# Patient Record
Sex: FEMALE | Race: WHITE | NOT HISPANIC OR LATINO | Employment: UNEMPLOYED | URBAN - METROPOLITAN AREA
[De-identification: names, ages, dates, MRNs, and addresses within clinical notes are randomized per-mention and may not be internally consistent; named-entity substitution may affect disease eponyms.]

---

## 2023-01-01 ENCOUNTER — APPOINTMENT (OUTPATIENT)
Dept: GENERAL RADIOLOGY | Facility: HOSPITAL | Age: 78
DRG: 286 | End: 2023-01-01
Payer: MEDICARE

## 2023-01-01 ENCOUNTER — HOSPITAL ENCOUNTER (INPATIENT)
Facility: HOSPITAL | Age: 78
LOS: 4 days | DRG: 286 | End: 2023-03-20
Attending: EMERGENCY MEDICINE | Admitting: INTERNAL MEDICINE
Payer: MEDICARE

## 2023-01-01 ENCOUNTER — APPOINTMENT (OUTPATIENT)
Dept: CARDIOLOGY | Facility: HOSPITAL | Age: 78
DRG: 286 | End: 2023-01-01
Payer: MEDICARE

## 2023-01-01 VITALS
DIASTOLIC BLOOD PRESSURE: 59 MMHG | HEART RATE: 95 BPM | HEIGHT: 64 IN | RESPIRATION RATE: 19 BRPM | TEMPERATURE: 96.9 F | OXYGEN SATURATION: 90 % | WEIGHT: 128.75 LBS | BODY MASS INDEX: 21.98 KG/M2 | SYSTOLIC BLOOD PRESSURE: 113 MMHG

## 2023-01-01 DIAGNOSIS — I21.02 ST ELEVATION MYOCARDIAL INFARCTION INVOLVING LEFT ANTERIOR DESCENDING (LAD) CORONARY ARTERY: Primary | ICD-10-CM

## 2023-01-01 DIAGNOSIS — I48.91 ATRIAL FIBRILLATION WITH RVR: ICD-10-CM

## 2023-01-01 DIAGNOSIS — J44.1 COPD EXACERBATION: ICD-10-CM

## 2023-01-01 LAB
ACT BLD: 137 SECONDS (ref 89–137)
ALBUMIN SERPL-MCNC: 3 G/DL (ref 3.5–5.2)
ALBUMIN SERPL-MCNC: 3.6 G/DL (ref 3.5–5.2)
ALBUMIN SERPL-MCNC: 3.9 G/DL (ref 3.5–5.2)
ALBUMIN/GLOB SERPL: 1.4 G/DL
ALBUMIN/GLOB SERPL: 1.4 G/DL
ALP SERPL-CCNC: 71 U/L (ref 39–117)
ALP SERPL-CCNC: 82 U/L (ref 39–117)
ALT SERPL W P-5'-P-CCNC: 10 U/L (ref 1–33)
ALT SERPL W P-5'-P-CCNC: 18 U/L (ref 1–33)
AMMONIA BLD-SCNC: 37 UMOL/L (ref 11–51)
ANION GAP SERPL CALCULATED.3IONS-SCNC: 12 MMOL/L (ref 5–15)
ANION GAP SERPL CALCULATED.3IONS-SCNC: 12 MMOL/L (ref 5–15)
ANION GAP SERPL CALCULATED.3IONS-SCNC: 14 MMOL/L (ref 5–15)
ANION GAP SERPL CALCULATED.3IONS-SCNC: 15 MMOL/L (ref 5–15)
ARTERIAL PATENCY WRIST A: ABNORMAL
ARTERIAL PATENCY WRIST A: ABNORMAL
ARTERIAL PATENCY WRIST A: POSITIVE
AST SERPL-CCNC: 17 U/L (ref 1–32)
AST SERPL-CCNC: 34 U/L (ref 1–32)
ATMOSPHERIC PRESS: ABNORMAL MM[HG]
B PARAPERT DNA SPEC QL NAA+PROBE: NOT DETECTED
B PERT DNA SPEC QL NAA+PROBE: NOT DETECTED
BACTERIA SPEC RESP CULT: ABNORMAL
BACTERIA SPEC RESP CULT: ABNORMAL
BACTERIA UR QL AUTO: ABNORMAL /HPF
BASE EXCESS BLDA CALC-SCNC: -13.1 MMOL/L (ref 0–3)
BASE EXCESS BLDA CALC-SCNC: -2.8 MMOL/L (ref 0–3)
BASE EXCESS BLDA CALC-SCNC: -2.9 MMOL/L (ref 0–3)
BASE EXCESS BLDA CALC-SCNC: -4.8 MMOL/L (ref 0–3)
BASE EXCESS BLDA CALC-SCNC: -8.1 MMOL/L (ref 0–3)
BASE EXCESS BLDV CALC-SCNC: -3.8 MMOL/L (ref -2–2)
BASE EXCESS BLDV CALC-SCNC: -3.8 MMOL/L (ref -2–2)
BASOPHILS # BLD AUTO: 0.1 10*3/MM3 (ref 0–0.2)
BASOPHILS # BLD AUTO: 0.1 10*3/MM3 (ref 0–0.2)
BASOPHILS NFR BLD AUTO: 0.3 % (ref 0–1.5)
BASOPHILS NFR BLD AUTO: 0.6 % (ref 0–1.5)
BDY SITE: ABNORMAL
BH CV ECHO MEAS - EDV(CUBED): 91.1 ML
BH CV ECHO MEAS - EDV(MOD-SP4): 96.3 ML
BH CV ECHO MEAS - EF(MOD-SP4): 30.6 %
BH CV ECHO MEAS - ESV(CUBED): 35.9 ML
BH CV ECHO MEAS - ESV(MOD-SP4): 66.9 ML
BH CV ECHO MEAS - FS: 26.7 %
BH CV ECHO MEAS - IVS/LVPW: 1 CM
BH CV ECHO MEAS - IVSD: 0.8 CM
BH CV ECHO MEAS - LA DIMENSION: 3.1 CM
BH CV ECHO MEAS - LV DIASTOLIC VOL/BSA (35-75): 59.7 CM2
BH CV ECHO MEAS - LV MASS(C)D: 113.6 GRAMS
BH CV ECHO MEAS - LV SYSTOLIC VOL/BSA (12-30): 41.5 CM2
BH CV ECHO MEAS - LVIDD: 4.5 CM
BH CV ECHO MEAS - LVIDS: 3.3 CM
BH CV ECHO MEAS - LVOT AREA: 3.1 CM2
BH CV ECHO MEAS - LVOT DIAM: 2 CM
BH CV ECHO MEAS - LVPWD: 0.8 CM
BH CV ECHO MEAS - MV A MAX VEL: 53.5 CM/SEC
BH CV ECHO MEAS - MV DEC SLOPE: 248 CM/SEC2
BH CV ECHO MEAS - MV DEC TIME: 0.25 MSEC
BH CV ECHO MEAS - MV E MAX VEL: 36.2 CM/SEC
BH CV ECHO MEAS - MV E/A: 0.68
BH CV ECHO MEAS - MV MAX PG: 0.93 MMHG
BH CV ECHO MEAS - MV MEAN PG: 1 MMHG
BH CV ECHO MEAS - MV P1/2T: 61.5 MSEC
BH CV ECHO MEAS - MV V2 VTI: 11.6 CM
BH CV ECHO MEAS - MVA(P1/2T): 3.6 CM2
BH CV ECHO MEAS - PA V2 MAX: 73 CM/SEC
BH CV ECHO MEAS - RAP SYSTOLE: 3 MMHG
BH CV ECHO MEAS - RV MAX PG: 0.95 MMHG
BH CV ECHO MEAS - RV V1 MAX: 48.7 CM/SEC
BH CV ECHO MEAS - RV V1 VTI: 6.6 CM
BH CV ECHO MEAS - RVDD: 2.8 CM
BH CV ECHO MEAS - RVSP: 43.7 MMHG
BH CV ECHO MEAS - SI(MOD-SP4): 18.2 ML/M2
BH CV ECHO MEAS - SV(MOD-SP4): 29.4 ML
BH CV ECHO MEAS - TR MAX PG: 40.7 MMHG
BH CV ECHO MEAS - TR MAX VEL: 319 CM/SEC
BILIRUB SERPL-MCNC: 0.2 MG/DL (ref 0–1.2)
BILIRUB SERPL-MCNC: 0.2 MG/DL (ref 0–1.2)
BILIRUB UR QL STRIP: NEGATIVE
BUN SERPL-MCNC: 10 MG/DL (ref 8–23)
BUN SERPL-MCNC: 12 MG/DL (ref 8–23)
BUN SERPL-MCNC: 14 MG/DL (ref 8–23)
BUN SERPL-MCNC: 25 MG/DL (ref 8–23)
BUN/CREAT SERPL: 15.2 (ref 7–25)
BUN/CREAT SERPL: 16.1 (ref 7–25)
BUN/CREAT SERPL: 17.9 (ref 7–25)
BUN/CREAT SERPL: 26.3 (ref 7–25)
BURR CELLS BLD QL SMEAR: ABNORMAL
C PNEUM DNA NPH QL NAA+NON-PROBE: NOT DETECTED
CA-I BLDA-SCNC: 0.97 MMOL/L (ref 1.15–1.33)
CA-I BLDA-SCNC: 1.15 MMOL/L (ref 1.15–1.33)
CA-I SERPL ISE-MCNC: 1.05 MMOL/L (ref 1.2–1.3)
CALCIUM SPEC-SCNC: 7.5 MG/DL (ref 8.6–10.5)
CALCIUM SPEC-SCNC: 7.7 MG/DL (ref 8.6–10.5)
CALCIUM SPEC-SCNC: 8.2 MG/DL (ref 8.6–10.5)
CALCIUM SPEC-SCNC: 8.9 MG/DL (ref 8.6–10.5)
CHLORIDE SERPL-SCNC: 102 MMOL/L (ref 98–107)
CHLORIDE SERPL-SCNC: 104 MMOL/L (ref 98–107)
CHLORIDE SERPL-SCNC: 96 MMOL/L (ref 98–107)
CHLORIDE SERPL-SCNC: 97 MMOL/L (ref 98–107)
CHOLEST SERPL-MCNC: 193 MG/DL (ref 0–200)
CLARITY UR: ABNORMAL
CO2 BLDA-SCNC: 18.1 MMOL/L (ref 22–29)
CO2 BLDA-SCNC: 23.4 MMOL/L (ref 22–29)
CO2 BLDA-SCNC: 24.5 MMOL/L (ref 22–29)
CO2 BLDA-SCNC: 25.6 MMOL/L (ref 22–29)
CO2 BLDA-SCNC: 27.2 MMOL/L (ref 22–29)
CO2 BLDA-SCNC: 28.1 MMOL/L (ref 22–29)
CO2 SERPL-SCNC: 17 MMOL/L (ref 22–29)
CO2 SERPL-SCNC: 22 MMOL/L (ref 22–29)
CO2 SERPL-SCNC: 23 MMOL/L (ref 22–29)
CO2 SERPL-SCNC: 25 MMOL/L (ref 22–29)
COLOR UR: YELLOW
CREAT SERPL-MCNC: 0.62 MG/DL (ref 0.57–1)
CREAT SERPL-MCNC: 0.78 MG/DL (ref 0.57–1)
CREAT SERPL-MCNC: 0.79 MG/DL (ref 0.57–1)
CREAT SERPL-MCNC: 0.95 MG/DL (ref 0.57–1)
D-LACTATE SERPL-SCNC: 3.2 MMOL/L (ref 0.2–2)
D-LACTATE SERPL-SCNC: 3.4 MMOL/L (ref 0.2–2)
D-LACTATE SERPL-SCNC: 3.7 MMOL/L (ref 0.5–2)
DEPRECATED RDW RBC AUTO: 42.4 FL (ref 37–54)
DEPRECATED RDW RBC AUTO: 43.3 FL (ref 37–54)
DEPRECATED RDW RBC AUTO: 43.8 FL (ref 37–54)
EGFRCR SERPLBLD CKD-EPI 2021: 60.7 ML/MIN/1.73
EGFRCR SERPLBLD CKD-EPI 2021: 75.7 ML/MIN/1.73
EGFRCR SERPLBLD CKD-EPI 2021: 76.9 ML/MIN/1.73
EGFRCR SERPLBLD CKD-EPI 2021: 90.2 ML/MIN/1.73
EOSINOPHIL # BLD AUTO: 0 10*3/MM3 (ref 0–0.4)
EOSINOPHIL # BLD AUTO: 0.3 10*3/MM3 (ref 0–0.4)
EOSINOPHIL # BLD MANUAL: 0.77 10*3/MM3 (ref 0–0.4)
EOSINOPHIL NFR BLD AUTO: 0.1 % (ref 0.3–6.2)
EOSINOPHIL NFR BLD AUTO: 1.9 % (ref 0.3–6.2)
EOSINOPHIL NFR BLD MANUAL: 3 % (ref 0.3–6.2)
ERYTHROCYTE [DISTWIDTH] IN BLOOD BY AUTOMATED COUNT: 13.6 % (ref 12.3–15.4)
ERYTHROCYTE [DISTWIDTH] IN BLOOD BY AUTOMATED COUNT: 13.7 % (ref 12.3–15.4)
ERYTHROCYTE [DISTWIDTH] IN BLOOD BY AUTOMATED COUNT: 14.4 % (ref 12.3–15.4)
FLUAV SUBTYP SPEC NAA+PROBE: NOT DETECTED
FLUBV RNA ISLT QL NAA+PROBE: NOT DETECTED
GEN 5 2HR TROPONIN T REFLEX: 632 NG/L
GLOBULIN UR ELPH-MCNC: 2.1 GM/DL
GLOBULIN UR ELPH-MCNC: 2.7 GM/DL
GLUCOSE BLDC GLUCOMTR-MCNC: 100 MG/DL (ref 70–105)
GLUCOSE BLDC GLUCOMTR-MCNC: 105 MG/DL (ref 70–105)
GLUCOSE BLDC GLUCOMTR-MCNC: 109 MG/DL (ref 70–105)
GLUCOSE BLDC GLUCOMTR-MCNC: 110 MG/DL (ref 70–105)
GLUCOSE BLDC GLUCOMTR-MCNC: 112 MG/DL (ref 70–105)
GLUCOSE BLDC GLUCOMTR-MCNC: 112 MG/DL (ref 70–105)
GLUCOSE BLDC GLUCOMTR-MCNC: 121 MG/DL (ref 70–105)
GLUCOSE BLDC GLUCOMTR-MCNC: 122 MG/DL (ref 70–105)
GLUCOSE BLDC GLUCOMTR-MCNC: 127 MG/DL (ref 70–105)
GLUCOSE BLDC GLUCOMTR-MCNC: 128 MG/DL (ref 70–105)
GLUCOSE BLDC GLUCOMTR-MCNC: 140 MG/DL (ref 70–105)
GLUCOSE BLDC GLUCOMTR-MCNC: 145 MG/DL (ref 70–105)
GLUCOSE BLDC GLUCOMTR-MCNC: 156 MG/DL (ref 70–105)
GLUCOSE BLDC GLUCOMTR-MCNC: 166 MG/DL (ref 70–105)
GLUCOSE BLDC GLUCOMTR-MCNC: 169 MG/DL (ref 70–105)
GLUCOSE BLDC GLUCOMTR-MCNC: 178 MG/DL (ref 70–105)
GLUCOSE BLDC GLUCOMTR-MCNC: 181 MG/DL (ref 70–105)
GLUCOSE BLDC GLUCOMTR-MCNC: 191 MG/DL (ref 70–105)
GLUCOSE BLDC GLUCOMTR-MCNC: 256 MG/DL (ref 70–105)
GLUCOSE BLDC GLUCOMTR-MCNC: 321 MG/DL (ref 70–105)
GLUCOSE BLDC GLUCOMTR-MCNC: 417 MG/DL (ref 70–105)
GLUCOSE BLDC GLUCOMTR-MCNC: 448 MG/DL (ref 74–100)
GLUCOSE BLDC GLUCOMTR-MCNC: 448 MG/DL (ref 74–100)
GLUCOSE BLDC GLUCOMTR-MCNC: 476 MG/DL (ref 74–100)
GLUCOSE BLDC GLUCOMTR-MCNC: 476 MG/DL (ref 74–100)
GLUCOSE SERPL-MCNC: 134 MG/DL (ref 65–99)
GLUCOSE SERPL-MCNC: 159 MG/DL (ref 65–99)
GLUCOSE SERPL-MCNC: 254 MG/DL (ref 65–99)
GLUCOSE SERPL-MCNC: 435 MG/DL (ref 65–99)
GLUCOSE UR STRIP-MCNC: ABNORMAL MG/DL
GRAM STN SPEC: ABNORMAL
HADV DNA SPEC NAA+PROBE: NOT DETECTED
HBA1C MFR BLD: 5.7 % (ref 4.8–5.6)
HCO3 BLDA-SCNC: 17.1 MMOL/L (ref 21–28)
HCO3 BLDA-SCNC: 22.2 MMOL/L (ref 21–28)
HCO3 BLDA-SCNC: 23.8 MMOL/L (ref 21–28)
HCO3 BLDA-SCNC: 23.8 MMOL/L (ref 21–28)
HCO3 BLDA-SCNC: 25.3 MMOL/L (ref 21–28)
HCO3 BLDV-SCNC: 23 MMOL/L (ref 22–26)
HCO3 BLDV-SCNC: 25.2 MMOL/L (ref 22–26)
HCOV 229E RNA SPEC QL NAA+PROBE: NOT DETECTED
HCOV HKU1 RNA SPEC QL NAA+PROBE: NOT DETECTED
HCOV NL63 RNA SPEC QL NAA+PROBE: NOT DETECTED
HCOV OC43 RNA SPEC QL NAA+PROBE: NOT DETECTED
HCT VFR BLD AUTO: 36.6 % (ref 34–46.6)
HCT VFR BLD AUTO: 36.9 % (ref 34–46.6)
HCT VFR BLD AUTO: 39.9 % (ref 34–46.6)
HCT VFR BLDA CALC: 34 % (ref 38–51)
HCT VFR BLDA CALC: 40 % (ref 38–51)
HDLC SERPL-MCNC: 78 MG/DL (ref 40–60)
HEMODILUTION: NO
HGB BLD-MCNC: 11.3 G/DL (ref 12–15.9)
HGB BLD-MCNC: 11.7 G/DL (ref 12–15.9)
HGB BLD-MCNC: 12.9 G/DL (ref 12–15.9)
HGB BLDA-MCNC: 11.5 G/DL (ref 12–17)
HGB BLDA-MCNC: 13.5 G/DL (ref 12–17)
HGB UR QL STRIP.AUTO: ABNORMAL
HMPV RNA NPH QL NAA+NON-PROBE: NOT DETECTED
HPIV1 RNA ISLT QL NAA+PROBE: NOT DETECTED
HPIV2 RNA SPEC QL NAA+PROBE: NOT DETECTED
HPIV3 RNA NPH QL NAA+PROBE: NOT DETECTED
HPIV4 P GENE NPH QL NAA+PROBE: NOT DETECTED
HYALINE CASTS UR QL AUTO: ABNORMAL /LPF
INHALED O2 CONCENTRATION: 100 %
INHALED O2 CONCENTRATION: 40 %
INHALED O2 CONCENTRATION: 44 %
INHALED O2 CONCENTRATION: 44 %
INHALED O2 CONCENTRATION: 46 %
INR PPP: 0.96 (ref 0.93–1.1)
KETONES UR QL STRIP: NEGATIVE
L PNEUMO1 AG UR QL IA: NEGATIVE
LARGE PLATELETS: ABNORMAL
LDLC SERPL CALC-MCNC: 100 MG/DL (ref 0–100)
LDLC/HDLC SERPL: 1.26 {RATIO}
LEFT ATRIUM VOLUME INDEX: 24.3 ML/M2
LEUKOCYTE ESTERASE UR QL STRIP.AUTO: NEGATIVE
LYMPHOCYTES # BLD AUTO: 1.1 10*3/MM3 (ref 0.7–3.1)
LYMPHOCYTES # BLD AUTO: 5.4 10*3/MM3 (ref 0.7–3.1)
LYMPHOCYTES # BLD MANUAL: 2.84 10*3/MM3 (ref 0.7–3.1)
LYMPHOCYTES NFR BLD AUTO: 34 % (ref 19.6–45.3)
LYMPHOCYTES NFR BLD AUTO: 4.4 % (ref 19.6–45.3)
M PNEUMO IGG SER IA-ACNC: NOT DETECTED
MAGNESIUM SERPL-MCNC: 1.9 MG/DL (ref 1.6–2.4)
MAGNESIUM SERPL-MCNC: 2 MG/DL (ref 1.6–2.4)
MAGNESIUM SERPL-MCNC: 2 MG/DL (ref 1.6–2.4)
MAXIMAL PREDICTED HEART RATE: 140 BPM
MCH RBC QN AUTO: 26.9 PG (ref 26.6–33)
MCH RBC QN AUTO: 27.1 PG (ref 26.6–33)
MCH RBC QN AUTO: 27.6 PG (ref 26.6–33)
MCHC RBC AUTO-ENTMCNC: 30.9 G/DL (ref 31.5–35.7)
MCHC RBC AUTO-ENTMCNC: 31.7 G/DL (ref 31.5–35.7)
MCHC RBC AUTO-ENTMCNC: 32.3 G/DL (ref 31.5–35.7)
MCV RBC AUTO: 85.3 FL (ref 79–97)
MCV RBC AUTO: 85.5 FL (ref 79–97)
MCV RBC AUTO: 86.9 FL (ref 79–97)
MODALITY: ABNORMAL
MONOCYTES # BLD AUTO: 1.1 10*3/MM3 (ref 0.1–0.9)
MONOCYTES # BLD AUTO: 1.1 10*3/MM3 (ref 0.1–0.9)
MONOCYTES NFR BLD AUTO: 4.3 % (ref 5–12)
MONOCYTES NFR BLD AUTO: 7 % (ref 5–12)
MRSA DNA SPEC QL NAA+PROBE: NORMAL
MYELOCYTES NFR BLD MANUAL: 1 % (ref 0–0)
NEUTROPHILS # BLD AUTO: 21.93 10*3/MM3 (ref 1.7–7)
NEUTROPHILS NFR BLD AUTO: 23.6 10*3/MM3 (ref 1.7–7)
NEUTROPHILS NFR BLD AUTO: 56.5 % (ref 42.7–76)
NEUTROPHILS NFR BLD AUTO: 8.9 10*3/MM3 (ref 1.7–7)
NEUTROPHILS NFR BLD AUTO: 90.9 % (ref 42.7–76)
NEUTROPHILS NFR BLD MANUAL: 76 % (ref 42.7–76)
NEUTS BAND NFR BLD MANUAL: 9 % (ref 0–5)
NITRITE UR QL STRIP: NEGATIVE
NRBC BLD AUTO-RTO: 0.1 /100 WBC (ref 0–0.2)
NRBC BLD AUTO-RTO: 0.2 /100 WBC (ref 0–0.2)
PCO2 BLDA: 140.2 MM HG (ref 35–48)
PCO2 BLDA: 33.6 MM HG (ref 35–48)
PCO2 BLDA: 38.9 MM HG (ref 35–48)
PCO2 BLDA: 59.1 MM HG (ref 35–48)
PCO2 BLDA: 59.7 MM HG (ref 35–48)
PCO2 BLDV: 48 MM HG (ref 42–51)
PCO2 BLDV: 64.3 MM HG (ref 42–51)
PEEP RESPIRATORY: 5 CM[H2O]
PH BLDA: 6.84 PH UNITS (ref 7.35–7.45)
PH BLDA: 7.21 PH UNITS (ref 7.35–7.45)
PH BLDA: 7.24 PH UNITS (ref 7.35–7.45)
PH BLDA: 7.32 PH UNITS (ref 7.35–7.45)
PH BLDA: 7.37 PH UNITS (ref 7.35–7.45)
PH BLDV: 7.2 PH UNITS (ref 7.32–7.43)
PH BLDV: 7.29 PH UNITS (ref 7.32–7.43)
PH UR STRIP.AUTO: <=5 [PH] (ref 5–8)
PHOSPHATE SERPL-MCNC: 4.6 MG/DL (ref 2.5–4.5)
PHOSPHATE SERPL-MCNC: 8 MG/DL (ref 2.5–4.5)
PLATELET # BLD AUTO: 181 10*3/MM3 (ref 140–450)
PLATELET # BLD AUTO: 310 10*3/MM3 (ref 140–450)
PLATELET # BLD AUTO: 317 10*3/MM3 (ref 140–450)
PMV BLD AUTO: 7.7 FL (ref 6–12)
PMV BLD AUTO: 7.7 FL (ref 6–12)
PMV BLD AUTO: 7.8 FL (ref 6–12)
PO2 BLDA: 113.1 MM HG (ref 83–108)
PO2 BLDA: 114.2 MM HG (ref 83–108)
PO2 BLDA: 141 MM HG (ref 83–108)
PO2 BLDA: 175.1 MM HG (ref 83–108)
PO2 BLDA: 94.2 MM HG (ref 83–108)
PO2 BLDV: 32 MM HG (ref 40–42)
PO2 BLDV: 40.3 MM HG (ref 40–42)
POIKILOCYTOSIS BLD QL SMEAR: ABNORMAL
POTASSIUM BLDA-SCNC: 4 MMOL/L (ref 3.5–4.5)
POTASSIUM BLDA-SCNC: 4.4 MMOL/L (ref 3.5–4.5)
POTASSIUM SERPL-SCNC: 4.2 MMOL/L (ref 3.5–5.2)
POTASSIUM SERPL-SCNC: 4.5 MMOL/L (ref 3.5–5.2)
POTASSIUM SERPL-SCNC: 4.8 MMOL/L (ref 3.5–5.2)
POTASSIUM SERPL-SCNC: 4.9 MMOL/L (ref 3.5–5.2)
PROCALCITONIN SERPL-MCNC: 0.06 NG/ML (ref 0–0.25)
PROT SERPL-MCNC: 5.1 G/DL (ref 6–8.5)
PROT SERPL-MCNC: 6.6 G/DL (ref 6–8.5)
PROT UR QL STRIP: ABNORMAL
PROTHROMBIN TIME: 9.9 SECONDS (ref 9.6–11.7)
PSV: 10 CMH2O
QT INTERVAL: 299 MS
QT INTERVAL: 315 MS
QT INTERVAL: 358 MS
QT INTERVAL: 397 MS
RBC # BLD AUTO: 4.22 10*6/MM3 (ref 3.77–5.28)
RBC # BLD AUTO: 4.32 10*6/MM3 (ref 3.77–5.28)
RBC # BLD AUTO: 4.68 10*6/MM3 (ref 3.77–5.28)
RBC # UR STRIP: ABNORMAL /HPF
REF LAB TEST METHOD: ABNORMAL
RESPIRATORY RATE: 24
RESPIRATORY RATE: 24
RHINOVIRUS RNA SPEC NAA+PROBE: NOT DETECTED
RSV RNA NPH QL NAA+NON-PROBE: NOT DETECTED
S PNEUM AG SPEC QL LA: NEGATIVE
SAO2 % BLDCOA: 95.1 % (ref 94–98)
SAO2 % BLDCOA: 97.3 % (ref 94–98)
SAO2 % BLDCOA: 97.4 % (ref 94–98)
SAO2 % BLDCOA: 98.3 % (ref 94–98)
SAO2 % BLDCOA: 99 % (ref 94–98)
SAO2 % BLDCOV: 47 % (ref 45–75)
SAO2 % BLDCOV: 68.8 % (ref 45–75)
SARS-COV-2 RNA NPH QL NAA+NON-PROBE: NOT DETECTED
SINUS: 4 CM
SODIUM BLD-SCNC: 123 MMOL/L (ref 138–146)
SODIUM BLD-SCNC: 132 MMOL/L (ref 138–146)
SODIUM SERPL-SCNC: 133 MMOL/L (ref 136–145)
SODIUM SERPL-SCNC: 134 MMOL/L (ref 136–145)
SODIUM SERPL-SCNC: 135 MMOL/L (ref 136–145)
SODIUM SERPL-SCNC: 137 MMOL/L (ref 136–145)
SP GR UR STRIP: 1.05 (ref 1–1.03)
SQUAMOUS #/AREA URNS HPF: ABNORMAL /HPF
STRESS TARGET HR: 119 BPM
TRIGL SERPL-MCNC: 83 MG/DL (ref 0–150)
TROPONIN T DELTA: 594 NG/L
TROPONIN T SERPL HS-MCNC: 38 NG/L
TSH SERPL DL<=0.05 MIU/L-ACNC: 2.79 UIU/ML (ref 0.27–4.2)
UROBILINOGEN UR QL STRIP: ABNORMAL
VARIANT LYMPHS NFR BLD MANUAL: 4 % (ref 0–5)
VARIANT LYMPHS NFR BLD MANUAL: 7 % (ref 19.6–45.3)
VENTILATOR MODE: ABNORMAL
VLDLC SERPL-MCNC: 15 MG/DL (ref 5–40)
VT ON VENT VENT: 500 ML
VT ON VENT VENT: 500 ML
WBC # UR STRIP: ABNORMAL /HPF
WBC MORPH BLD: NORMAL
WBC NRBC COR # BLD: 15.7 10*3/MM3 (ref 3.4–10.8)
WBC NRBC COR # BLD: 25.8 10*3/MM3 (ref 3.4–10.8)
WBC NRBC COR # BLD: 25.9 10*3/MM3 (ref 3.4–10.8)
WHOLE BLOOD HOLD COAG: NORMAL
WHOLE BLOOD HOLD SPECIMEN: NORMAL

## 2023-01-01 PROCEDURE — 25010000002 METHYLPREDNISOLONE PER 40 MG: Performed by: INTERNAL MEDICINE

## 2023-01-01 PROCEDURE — 84145 PROCALCITONIN (PCT): CPT | Performed by: INTERNAL MEDICINE

## 2023-01-01 PROCEDURE — 83605 ASSAY OF LACTIC ACID: CPT

## 2023-01-01 PROCEDURE — 25010000002 ONDANSETRON PER 1 MG: Performed by: EMERGENCY MEDICINE

## 2023-01-01 PROCEDURE — 85610 PROTHROMBIN TIME: CPT | Performed by: EMERGENCY MEDICINE

## 2023-01-01 PROCEDURE — 82330 ASSAY OF CALCIUM: CPT | Performed by: NURSE PRACTITIONER

## 2023-01-01 PROCEDURE — 99233 SBSQ HOSP IP/OBS HIGH 50: CPT | Performed by: INTERNAL MEDICINE

## 2023-01-01 PROCEDURE — C1894 INTRO/SHEATH, NON-LASER: HCPCS | Performed by: INTERNAL MEDICINE

## 2023-01-01 PROCEDURE — 80069 RENAL FUNCTION PANEL: CPT | Performed by: INTERNAL MEDICINE

## 2023-01-01 PROCEDURE — B2111ZZ FLUOROSCOPY OF MULTIPLE CORONARY ARTERIES USING LOW OSMOLAR CONTRAST: ICD-10-PCS | Performed by: INTERNAL MEDICINE

## 2023-01-01 PROCEDURE — 99285 EMERGENCY DEPT VISIT HI MDM: CPT

## 2023-01-01 PROCEDURE — 80048 BASIC METABOLIC PNL TOTAL CA: CPT | Performed by: INTERNAL MEDICINE

## 2023-01-01 PROCEDURE — 83735 ASSAY OF MAGNESIUM: CPT | Performed by: EMERGENCY MEDICINE

## 2023-01-01 PROCEDURE — 82962 GLUCOSE BLOOD TEST: CPT

## 2023-01-01 PROCEDURE — 93306 TTE W/DOPPLER COMPLETE: CPT | Performed by: INTERNAL MEDICINE

## 2023-01-01 PROCEDURE — 94761 N-INVAS EAR/PLS OXIMETRY MLT: CPT

## 2023-01-01 PROCEDURE — 80053 COMPREHEN METABOLIC PANEL: CPT | Performed by: EMERGENCY MEDICINE

## 2023-01-01 PROCEDURE — B2151ZZ FLUOROSCOPY OF LEFT HEART USING LOW OSMOLAR CONTRAST: ICD-10-PCS | Performed by: INTERNAL MEDICINE

## 2023-01-01 PROCEDURE — 94660 CPAP INITIATION&MGMT: CPT

## 2023-01-01 PROCEDURE — 25010000002 FUROSEMIDE PER 20 MG: Performed by: INTERNAL MEDICINE

## 2023-01-01 PROCEDURE — 94799 UNLISTED PULMONARY SVC/PX: CPT

## 2023-01-01 PROCEDURE — 25010000002 PHENYLEPHRINE 10 MG/ML SOLUTION: Performed by: INTERNAL MEDICINE

## 2023-01-01 PROCEDURE — 83735 ASSAY OF MAGNESIUM: CPT | Performed by: NURSE PRACTITIONER

## 2023-01-01 PROCEDURE — 99223 1ST HOSP IP/OBS HIGH 75: CPT | Performed by: INTERNAL MEDICINE

## 2023-01-01 PROCEDURE — 85025 COMPLETE CBC W/AUTO DIFF WBC: CPT | Performed by: EMERGENCY MEDICINE

## 2023-01-01 PROCEDURE — 25010000002 LORAZEPAM PER 2 MG: Performed by: STUDENT IN AN ORGANIZED HEALTH CARE EDUCATION/TRAINING PROGRAM

## 2023-01-01 PROCEDURE — 93010 ELECTROCARDIOGRAM REPORT: CPT | Performed by: INTERNAL MEDICINE

## 2023-01-01 PROCEDURE — 25010000002 ENOXAPARIN PER 10 MG: Performed by: INTERNAL MEDICINE

## 2023-01-01 PROCEDURE — 25010000002 AMIODARONE IN DEXTROSE 5% 150-4.21 MG/100ML-% SOLUTION: Performed by: STUDENT IN AN ORGANIZED HEALTH CARE EDUCATION/TRAINING PROGRAM

## 2023-01-01 PROCEDURE — 80053 COMPREHEN METABOLIC PANEL: CPT | Performed by: NURSE PRACTITIONER

## 2023-01-01 PROCEDURE — 71045 X-RAY EXAM CHEST 1 VIEW: CPT

## 2023-01-01 PROCEDURE — 85027 COMPLETE CBC AUTOMATED: CPT | Performed by: NURSE PRACTITIONER

## 2023-01-01 PROCEDURE — 81001 URINALYSIS AUTO W/SCOPE: CPT | Performed by: NURSE PRACTITIONER

## 2023-01-01 PROCEDURE — 93005 ELECTROCARDIOGRAM TRACING: CPT | Performed by: EMERGENCY MEDICINE

## 2023-01-01 PROCEDURE — 25010000002 MORPHINE PER 10 MG: Performed by: STUDENT IN AN ORGANIZED HEALTH CARE EDUCATION/TRAINING PROGRAM

## 2023-01-01 PROCEDURE — 25010000002 LORAZEPAM PER 2 MG

## 2023-01-01 PROCEDURE — 93454 CORONARY ARTERY ANGIO S&I: CPT | Performed by: INTERNAL MEDICINE

## 2023-01-01 PROCEDURE — 25010000002 AMIODARONE PER 30 MG: Performed by: STUDENT IN AN ORGANIZED HEALTH CARE EDUCATION/TRAINING PROGRAM

## 2023-01-01 PROCEDURE — C9803 HOPD COVID-19 SPEC COLLECT: HCPCS | Performed by: INTERNAL MEDICINE

## 2023-01-01 PROCEDURE — 4A023N7 MEASUREMENT OF CARDIAC SAMPLING AND PRESSURE, LEFT HEART, PERCUTANEOUS APPROACH: ICD-10-PCS | Performed by: INTERNAL MEDICINE

## 2023-01-01 PROCEDURE — 85018 HEMOGLOBIN: CPT

## 2023-01-01 PROCEDURE — 25010000002 HEPARIN (PORCINE) PER 1000 UNITS: Performed by: EMERGENCY MEDICINE

## 2023-01-01 PROCEDURE — 84443 ASSAY THYROID STIM HORMONE: CPT | Performed by: INTERNAL MEDICINE

## 2023-01-01 PROCEDURE — 25010000002 DIGOXIN PER 500 MCG: Performed by: INTERNAL MEDICINE

## 2023-01-01 PROCEDURE — 25010000002 MIDAZOLAM PER 1 MG: Performed by: INTERNAL MEDICINE

## 2023-01-01 PROCEDURE — 87449 NOS EACH ORGANISM AG IA: CPT | Performed by: NURSE PRACTITIONER

## 2023-01-01 PROCEDURE — 25010000002 PROPOFOL 10 MG/ML EMULSION: Performed by: INTERNAL MEDICINE

## 2023-01-01 PROCEDURE — 25010000002 ONDANSETRON PER 1 MG: Performed by: NURSE PRACTITIONER

## 2023-01-01 PROCEDURE — 82330 ASSAY OF CALCIUM: CPT

## 2023-01-01 PROCEDURE — 80061 LIPID PANEL: CPT | Performed by: NURSE PRACTITIONER

## 2023-01-01 PROCEDURE — 87205 SMEAR GRAM STAIN: CPT | Performed by: NURSE PRACTITIONER

## 2023-01-01 PROCEDURE — 82803 BLOOD GASES ANY COMBINATION: CPT

## 2023-01-01 PROCEDURE — 94003 VENT MGMT INPAT SUBQ DAY: CPT

## 2023-01-01 PROCEDURE — 94640 AIRWAY INHALATION TREATMENT: CPT

## 2023-01-01 PROCEDURE — 84484 ASSAY OF TROPONIN QUANT: CPT | Performed by: EMERGENCY MEDICINE

## 2023-01-01 PROCEDURE — 85007 BL SMEAR W/DIFF WBC COUNT: CPT | Performed by: NURSE PRACTITIONER

## 2023-01-01 PROCEDURE — 36600 WITHDRAWAL OF ARTERIAL BLOOD: CPT

## 2023-01-01 PROCEDURE — 87899 AGENT NOS ASSAY W/OPTIC: CPT | Performed by: NURSE PRACTITIONER

## 2023-01-01 PROCEDURE — 63710000001 INSULIN LISPRO (HUMAN) PER 5 UNITS: Performed by: NURSE PRACTITIONER

## 2023-01-01 PROCEDURE — 93005 ELECTROCARDIOGRAM TRACING: CPT

## 2023-01-01 PROCEDURE — 82140 ASSAY OF AMMONIA: CPT | Performed by: STUDENT IN AN ORGANIZED HEALTH CARE EDUCATION/TRAINING PROGRAM

## 2023-01-01 PROCEDURE — 25010000002 PHENYLEPHRINE 10 MG/ML SOLUTION 5 ML VIAL: Performed by: INTERNAL MEDICINE

## 2023-01-01 PROCEDURE — 85025 COMPLETE CBC W/AUTO DIFF WBC: CPT | Performed by: NURSE PRACTITIONER

## 2023-01-01 PROCEDURE — 87185 SC STD ENZYME DETCJ PER NZM: CPT | Performed by: NURSE PRACTITIONER

## 2023-01-01 PROCEDURE — 36415 COLL VENOUS BLD VENIPUNCTURE: CPT | Performed by: NURSE PRACTITIONER

## 2023-01-01 PROCEDURE — 85347 COAGULATION TIME ACTIVATED: CPT

## 2023-01-01 PROCEDURE — 87077 CULTURE AEROBIC IDENTIFY: CPT | Performed by: NURSE PRACTITIONER

## 2023-01-01 PROCEDURE — 25010000002 CEFEPIME PER 500 MG: Performed by: NURSE PRACTITIONER

## 2023-01-01 PROCEDURE — 87040 BLOOD CULTURE FOR BACTERIA: CPT | Performed by: NURSE PRACTITIONER

## 2023-01-01 PROCEDURE — 93306 TTE W/DOPPLER COMPLETE: CPT

## 2023-01-01 PROCEDURE — C1751 CATH, INF, PER/CENT/MIDLINE: HCPCS

## 2023-01-01 PROCEDURE — 93005 ELECTROCARDIOGRAM TRACING: CPT | Performed by: STUDENT IN AN ORGANIZED HEALTH CARE EDUCATION/TRAINING PROGRAM

## 2023-01-01 PROCEDURE — 25010000002 PHENYLEPHRINE 10 MG/ML SOLUTION: Performed by: NURSE PRACTITIONER

## 2023-01-01 PROCEDURE — 84100 ASSAY OF PHOSPHORUS: CPT | Performed by: NURSE PRACTITIONER

## 2023-01-01 PROCEDURE — 02HV33Z INSERTION OF INFUSION DEVICE INTO SUPERIOR VENA CAVA, PERCUTANEOUS APPROACH: ICD-10-PCS | Performed by: STUDENT IN AN ORGANIZED HEALTH CARE EDUCATION/TRAINING PROGRAM

## 2023-01-01 PROCEDURE — 25010000002 MORPHINE PER 10 MG

## 2023-01-01 PROCEDURE — 25010000002 HYDROMORPHONE 1 MG/ML SOLUTION: Performed by: STUDENT IN AN ORGANIZED HEALTH CARE EDUCATION/TRAINING PROGRAM

## 2023-01-01 PROCEDURE — 87641 MR-STAPH DNA AMP PROBE: CPT | Performed by: NURSE PRACTITIONER

## 2023-01-01 PROCEDURE — 87070 CULTURE OTHR SPECIMN AEROBIC: CPT | Performed by: NURSE PRACTITIONER

## 2023-01-01 PROCEDURE — 83036 HEMOGLOBIN GLYCOSYLATED A1C: CPT | Performed by: INTERNAL MEDICINE

## 2023-01-01 PROCEDURE — 25010000002 CALCIUM GLUCONATE 2-0.675 GM/100ML-% SOLUTION: Performed by: NURSE PRACTITIONER

## 2023-01-01 PROCEDURE — 94002 VENT MGMT INPAT INIT DAY: CPT

## 2023-01-01 PROCEDURE — 80051 ELECTROLYTE PANEL: CPT

## 2023-01-01 PROCEDURE — C1769 GUIDE WIRE: HCPCS | Performed by: INTERNAL MEDICINE

## 2023-01-01 PROCEDURE — 25510000001 IOPAMIDOL PER 1 ML: Performed by: INTERNAL MEDICINE

## 2023-01-01 PROCEDURE — 25010000002 MIDAZOLAM PER 1 MG

## 2023-01-01 PROCEDURE — 0202U NFCT DS 22 TRGT SARS-COV-2: CPT | Performed by: INTERNAL MEDICINE

## 2023-01-01 RX ORDER — ENOXAPARIN SODIUM 100 MG/ML
40 INJECTION SUBCUTANEOUS EVERY 24 HOURS
Status: DISCONTINUED | OUTPATIENT
Start: 2023-01-01 | End: 2023-01-01

## 2023-01-01 RX ORDER — POTASSIUM CHLORIDE 750 MG/1
10 CAPSULE, EXTENDED RELEASE ORAL DAILY
COMMUNITY

## 2023-01-01 RX ORDER — DEXTROSE MONOHYDRATE 25 G/50ML
25 INJECTION, SOLUTION INTRAVENOUS
Status: DISCONTINUED | OUTPATIENT
Start: 2023-01-01 | End: 2023-01-01

## 2023-01-01 RX ORDER — LORAZEPAM 2 MG/ML
1 CONCENTRATE ORAL
Status: DISCONTINUED | OUTPATIENT
Start: 2023-01-01 | End: 2023-01-01 | Stop reason: HOSPADM

## 2023-01-01 RX ORDER — LORAZEPAM 2 MG/ML
1 INJECTION INTRAMUSCULAR
Status: DISCONTINUED | OUTPATIENT
Start: 2023-01-01 | End: 2023-01-01

## 2023-01-01 RX ORDER — DILTIAZEM HCL/D5W 125 MG/125
5-15 PLASTIC BAG, INJECTION (ML) INTRAVENOUS CONTINUOUS
Status: DISCONTINUED | OUTPATIENT
Start: 2023-01-01 | End: 2023-01-01

## 2023-01-01 RX ORDER — IPRATROPIUM BROMIDE AND ALBUTEROL SULFATE 2.5; .5 MG/3ML; MG/3ML
3 SOLUTION RESPIRATORY (INHALATION)
Status: DISCONTINUED | OUTPATIENT
Start: 2023-01-01 | End: 2023-01-01

## 2023-01-01 RX ORDER — ASPIRIN 81 MG/1
81 TABLET ORAL DAILY
Status: DISCONTINUED | OUTPATIENT
Start: 2023-01-01 | End: 2023-01-01

## 2023-01-01 RX ORDER — SODIUM CHLORIDE 9 MG/ML
30 INJECTION, SOLUTION INTRAVENOUS CONTINUOUS
Status: DISCONTINUED | OUTPATIENT
Start: 2023-01-01 | End: 2023-01-01

## 2023-01-01 RX ORDER — ACETAMINOPHEN 325 MG/1
650 TABLET ORAL EVERY 4 HOURS PRN
Status: DISCONTINUED | OUTPATIENT
Start: 2023-01-01 | End: 2023-01-01 | Stop reason: SDUPTHER

## 2023-01-01 RX ORDER — MORPHINE SULFATE 2 MG/ML
2 INJECTION, SOLUTION INTRAMUSCULAR; INTRAVENOUS EVERY 4 HOURS PRN
Status: DISCONTINUED | OUTPATIENT
Start: 2023-01-01 | End: 2023-01-01

## 2023-01-01 RX ORDER — BUDESONIDE 0.5 MG/2ML
1 INHALANT ORAL
Status: DISCONTINUED | OUTPATIENT
Start: 2023-01-01 | End: 2023-01-01

## 2023-01-01 RX ORDER — PHENYLEPHRINE HYDROCHLORIDE 10 MG/ML
INJECTION INTRAVENOUS
Status: DISCONTINUED | OUTPATIENT
Start: 2023-01-01 | End: 2023-01-01 | Stop reason: HOSPADM

## 2023-01-01 RX ORDER — GLYCOPYRROLATE 0.2 MG/ML
0.2 INJECTION INTRAMUSCULAR; INTRAVENOUS
Status: DISCONTINUED | OUTPATIENT
Start: 2023-01-01 | End: 2023-01-01 | Stop reason: HOSPADM

## 2023-01-01 RX ORDER — SODIUM CHLORIDE 9 MG/ML
250 INJECTION, SOLUTION INTRAVENOUS ONCE AS NEEDED
Status: DISCONTINUED | OUTPATIENT
Start: 2023-01-01 | End: 2023-01-01

## 2023-01-01 RX ORDER — DILTIAZEM HYDROCHLORIDE 5 MG/ML
10 INJECTION INTRAVENOUS ONCE
Status: COMPLETED | OUTPATIENT
Start: 2023-01-01 | End: 2023-01-01

## 2023-01-01 RX ORDER — CARBOXYMETHYLCELLULOSE SODIUM 10 MG/ML
1 GEL OPHTHALMIC
Status: DISCONTINUED | OUTPATIENT
Start: 2023-01-01 | End: 2023-01-01 | Stop reason: HOSPADM

## 2023-01-01 RX ORDER — DEXMEDETOMIDINE HYDROCHLORIDE 4 UG/ML
.2-1.5 INJECTION, SOLUTION INTRAVENOUS
Status: DISCONTINUED | OUTPATIENT
Start: 2023-01-01 | End: 2023-01-01

## 2023-01-01 RX ORDER — ATORVASTATIN CALCIUM 40 MG/1
40 TABLET, FILM COATED ORAL NIGHTLY
Status: DISCONTINUED | OUTPATIENT
Start: 2023-01-01 | End: 2023-01-01

## 2023-01-01 RX ORDER — OLANZAPINE 10 MG/2ML
1 INJECTION, POWDER, LYOPHILIZED, FOR SOLUTION INTRAMUSCULAR
Status: DISCONTINUED | OUTPATIENT
Start: 2023-01-01 | End: 2023-01-01

## 2023-01-01 RX ORDER — NITROGLYCERIN 20 MG/100ML
INJECTION INTRAVENOUS
Status: DISPENSED
Start: 2023-01-01 | End: 2023-01-01

## 2023-01-01 RX ORDER — AMIODARONE HCL/D5W 450 MG/250
1 PLASTIC BAG, INJECTION (ML) INTRAVENOUS CONTINUOUS
Status: DISCONTINUED | OUTPATIENT
Start: 2023-01-01 | End: 2023-01-01

## 2023-01-01 RX ORDER — SODIUM CHLORIDE 0.9 % (FLUSH) 0.9 %
10 SYRINGE (ML) INJECTION EVERY 12 HOURS SCHEDULED
Status: DISCONTINUED | OUTPATIENT
Start: 2023-01-01 | End: 2023-01-01

## 2023-01-01 RX ORDER — METHYLPREDNISOLONE SODIUM SUCCINATE 40 MG/ML
40 INJECTION, POWDER, LYOPHILIZED, FOR SOLUTION INTRAMUSCULAR; INTRAVENOUS EVERY 12 HOURS
Status: DISCONTINUED | OUTPATIENT
Start: 2023-01-01 | End: 2023-01-01

## 2023-01-01 RX ORDER — NOREPINEPHRINE BIT/0.9 % NACL 8 MG/250ML
INFUSION BOTTLE (ML) INTRAVENOUS
Status: COMPLETED
Start: 2023-01-01 | End: 2023-01-01

## 2023-01-01 RX ORDER — FUROSEMIDE 10 MG/ML
40 INJECTION INTRAMUSCULAR; INTRAVENOUS EVERY 12 HOURS
Status: DISCONTINUED | OUTPATIENT
Start: 2023-01-01 | End: 2023-01-01

## 2023-01-01 RX ORDER — GABAPENTIN 300 MG/1
300 CAPSULE ORAL 3 TIMES DAILY
COMMUNITY

## 2023-01-01 RX ORDER — SODIUM CHLORIDE 0.9 % (FLUSH) 0.9 %
10 SYRINGE (ML) INJECTION AS NEEDED
Status: DISCONTINUED | OUTPATIENT
Start: 2023-01-01 | End: 2023-01-01

## 2023-01-01 RX ORDER — ONDANSETRON 4 MG/1
4 TABLET, FILM COATED ORAL EVERY 6 HOURS PRN
Status: DISCONTINUED | OUTPATIENT
Start: 2023-01-01 | End: 2023-01-01

## 2023-01-01 RX ORDER — LORAZEPAM 1 MG/1
2 TABLET ORAL
Status: DISCONTINUED | OUTPATIENT
Start: 2023-01-01 | End: 2023-01-01

## 2023-01-01 RX ORDER — ACETAMINOPHEN 325 MG/1
650 TABLET ORAL EVERY 4 HOURS PRN
Status: DISCONTINUED | OUTPATIENT
Start: 2023-01-01 | End: 2023-01-01 | Stop reason: HOSPADM

## 2023-01-01 RX ORDER — LORAZEPAM 2 MG/ML
2 INJECTION INTRAMUSCULAR
Status: DISCONTINUED | OUTPATIENT
Start: 2023-01-01 | End: 2023-01-01

## 2023-01-01 RX ORDER — SODIUM CHLORIDE 9 MG/ML
40 INJECTION, SOLUTION INTRAVENOUS AS NEEDED
Status: DISCONTINUED | OUTPATIENT
Start: 2023-01-01 | End: 2023-01-01

## 2023-01-01 RX ORDER — ACETAMINOPHEN 650 MG/1
650 SUPPOSITORY RECTAL EVERY 4 HOURS PRN
Status: DISCONTINUED | OUTPATIENT
Start: 2023-01-01 | End: 2023-01-01

## 2023-01-01 RX ORDER — DIPHENOXYLATE HYDROCHLORIDE AND ATROPINE SULFATE 2.5; .025 MG/1; MG/1
1 TABLET ORAL
Status: DISCONTINUED | OUTPATIENT
Start: 2023-01-01 | End: 2023-01-01 | Stop reason: HOSPADM

## 2023-01-01 RX ORDER — HYDROCHLOROTHIAZIDE 25 MG/1
25 TABLET ORAL DAILY
COMMUNITY

## 2023-01-01 RX ORDER — POLYETHYLENE GLYCOL 3350 17 G/17G
17 POWDER, FOR SOLUTION ORAL DAILY PRN
Status: DISCONTINUED | OUTPATIENT
Start: 2023-01-01 | End: 2023-01-01

## 2023-01-01 RX ORDER — DILTIAZEM HCL/D5W 125 MG/125
PLASTIC BAG, INJECTION (ML) INTRAVENOUS
Status: DISCONTINUED
Start: 2023-01-01 | End: 2023-01-01

## 2023-01-01 RX ORDER — NICOTINE POLACRILEX 4 MG
15 LOZENGE BUCCAL
Status: DISCONTINUED | OUTPATIENT
Start: 2023-01-01 | End: 2023-01-01

## 2023-01-01 RX ORDER — ASPIRIN 81 MG/1
TABLET, CHEWABLE ORAL
Status: DISPENSED
Start: 2023-01-01 | End: 2023-01-01

## 2023-01-01 RX ORDER — AMIODARONE HCL/D5W 450 MG/250
0.5 PLASTIC BAG, INJECTION (ML) INTRAVENOUS CONTINUOUS
Status: DISCONTINUED | OUTPATIENT
Start: 2023-01-01 | End: 2023-01-01

## 2023-01-01 RX ORDER — DIGOXIN 0.25 MG/ML
250 INJECTION INTRAMUSCULAR; INTRAVENOUS ONCE
Status: COMPLETED | OUTPATIENT
Start: 2023-01-01 | End: 2023-01-01

## 2023-01-01 RX ORDER — MIDAZOLAM HYDROCHLORIDE 1 MG/ML
2 INJECTION INTRAMUSCULAR; INTRAVENOUS ONCE
Status: COMPLETED | OUTPATIENT
Start: 2023-01-01 | End: 2023-01-01

## 2023-01-01 RX ORDER — CALCIUM GLUCONATE 20 MG/ML
2 INJECTION, SOLUTION INTRAVENOUS ONCE
Status: COMPLETED | OUTPATIENT
Start: 2023-01-01 | End: 2023-01-01

## 2023-01-01 RX ORDER — ACETAMINOPHEN 160 MG/5ML
650 SOLUTION ORAL EVERY 4 HOURS PRN
Status: DISCONTINUED | OUTPATIENT
Start: 2023-01-01 | End: 2023-01-01 | Stop reason: HOSPADM

## 2023-01-01 RX ORDER — NITROGLYCERIN 20 MG/100ML
INJECTION INTRAVENOUS
Status: COMPLETED | OUTPATIENT
Start: 2023-01-01 | End: 2023-01-01

## 2023-01-01 RX ORDER — INSULIN LISPRO 100 [IU]/ML
2-9 INJECTION, SOLUTION INTRAVENOUS; SUBCUTANEOUS
Status: DISCONTINUED | OUTPATIENT
Start: 2023-01-01 | End: 2023-01-01

## 2023-01-01 RX ORDER — DEXMEDETOMIDINE HYDROCHLORIDE 4 UG/ML
.2-1.5 INJECTION, SOLUTION INTRAVENOUS CONTINUOUS PRN
Status: DISCONTINUED | OUTPATIENT
Start: 2023-01-01 | End: 2023-01-01

## 2023-01-01 RX ORDER — PANTOPRAZOLE SODIUM 40 MG/10ML
40 INJECTION, POWDER, LYOPHILIZED, FOR SOLUTION INTRAVENOUS
Status: DISCONTINUED | OUTPATIENT
Start: 2023-01-01 | End: 2023-01-01

## 2023-01-01 RX ORDER — ONDANSETRON 2 MG/ML
4 INJECTION INTRAMUSCULAR; INTRAVENOUS EVERY 6 HOURS PRN
Status: DISCONTINUED | OUTPATIENT
Start: 2023-01-01 | End: 2023-01-01

## 2023-01-01 RX ORDER — LORAZEPAM 2 MG/ML
1 INJECTION INTRAMUSCULAR
Status: DISCONTINUED | OUTPATIENT
Start: 2023-01-01 | End: 2023-01-01 | Stop reason: HOSPADM

## 2023-01-01 RX ORDER — METHYLPREDNISOLONE SODIUM SUCCINATE 40 MG/ML
40 INJECTION, POWDER, LYOPHILIZED, FOR SOLUTION INTRAMUSCULAR; INTRAVENOUS EVERY 6 HOURS
Status: DISCONTINUED | OUTPATIENT
Start: 2023-01-01 | End: 2023-01-01

## 2023-01-01 RX ORDER — DEXTROSE MONOHYDRATE 25 G/50ML
10-50 INJECTION, SOLUTION INTRAVENOUS
Status: DISCONTINUED | OUTPATIENT
Start: 2023-01-01 | End: 2023-01-01

## 2023-01-01 RX ORDER — HEPARIN SODIUM 1000 [USP'U]/ML
INJECTION, SOLUTION INTRAVENOUS; SUBCUTANEOUS
Status: DISPENSED
Start: 2023-01-01 | End: 2023-01-01

## 2023-01-01 RX ORDER — ONDANSETRON 2 MG/ML
INJECTION INTRAMUSCULAR; INTRAVENOUS
Status: DISPENSED
Start: 2023-01-01 | End: 2023-01-01

## 2023-01-01 RX ORDER — EPTIFIBATIDE 0.75 MG/ML
INJECTION, SOLUTION INTRAVENOUS
Status: DISPENSED
Start: 2023-01-01 | End: 2023-01-01

## 2023-01-01 RX ORDER — NOREPINEPHRINE BIT/0.9 % NACL 8 MG/250ML
.02-.3 INFUSION BOTTLE (ML) INTRAVENOUS
Status: DISCONTINUED | OUTPATIENT
Start: 2023-01-01 | End: 2023-01-01

## 2023-01-01 RX ORDER — ACETAMINOPHEN 325 MG/1
650 TABLET ORAL EVERY 4 HOURS PRN
Status: DISCONTINUED | OUTPATIENT
Start: 2023-01-01 | End: 2023-01-01

## 2023-01-01 RX ORDER — MORPHINE SULFATE 2 MG/ML
2 INJECTION, SOLUTION INTRAMUSCULAR; INTRAVENOUS ONCE
Status: COMPLETED | OUTPATIENT
Start: 2023-01-01 | End: 2023-01-01

## 2023-01-01 RX ORDER — MIDAZOLAM HYDROCHLORIDE 1 MG/ML
INJECTION INTRAMUSCULAR; INTRAVENOUS
Status: DISCONTINUED | OUTPATIENT
Start: 2023-01-01 | End: 2023-01-01 | Stop reason: HOSPADM

## 2023-01-01 RX ORDER — LORAZEPAM 2 MG/ML
1 INJECTION INTRAMUSCULAR EVERY 4 HOURS PRN
Status: DISCONTINUED | OUTPATIENT
Start: 2023-01-01 | End: 2023-01-01

## 2023-01-01 RX ORDER — HEPARIN SODIUM 5000 [USP'U]/ML
INJECTION, SOLUTION INTRAVENOUS; SUBCUTANEOUS
Status: COMPLETED | OUTPATIENT
Start: 2023-01-01 | End: 2023-01-01

## 2023-01-01 RX ORDER — ALUMINA, MAGNESIA, AND SIMETHICONE 2400; 2400; 240 MG/30ML; MG/30ML; MG/30ML
15 SUSPENSION ORAL EVERY 6 HOURS PRN
Status: DISCONTINUED | OUTPATIENT
Start: 2023-01-01 | End: 2023-01-01

## 2023-01-01 RX ORDER — FUROSEMIDE 10 MG/ML
40 INJECTION INTRAMUSCULAR; INTRAVENOUS ONCE
Status: DISCONTINUED | OUTPATIENT
Start: 2023-01-01 | End: 2023-01-01

## 2023-01-01 RX ORDER — ONDANSETRON 2 MG/ML
INJECTION INTRAMUSCULAR; INTRAVENOUS
Status: COMPLETED | OUTPATIENT
Start: 2023-01-01 | End: 2023-01-01

## 2023-01-01 RX ORDER — MIDAZOLAM HYDROCHLORIDE 1 MG/ML
INJECTION INTRAMUSCULAR; INTRAVENOUS
Status: COMPLETED
Start: 2023-01-01 | End: 2023-01-01

## 2023-01-01 RX ORDER — LORAZEPAM 0.5 MG/1
0.5 TABLET ORAL
Status: DISCONTINUED | OUTPATIENT
Start: 2023-01-01 | End: 2023-01-01

## 2023-01-01 RX ORDER — GLYCOPYRROLATE 0.2 MG/ML
0.4 INJECTION INTRAMUSCULAR; INTRAVENOUS
Status: DISCONTINUED | OUTPATIENT
Start: 2023-01-01 | End: 2023-01-01 | Stop reason: HOSPADM

## 2023-01-01 RX ORDER — LORAZEPAM 1 MG/1
1 TABLET ORAL
Status: DISCONTINUED | OUTPATIENT
Start: 2023-01-01 | End: 2023-01-01 | Stop reason: HOSPADM

## 2023-01-01 RX ORDER — DILTIAZEM HCL/D5W 125 MG/125
PLASTIC BAG, INJECTION (ML) INTRAVENOUS
Status: COMPLETED | OUTPATIENT
Start: 2023-01-01 | End: 2023-01-01

## 2023-01-01 RX ORDER — FENTANYL CITRATE 50 UG/ML
50 INJECTION, SOLUTION INTRAMUSCULAR; INTRAVENOUS
Status: DISCONTINUED | OUTPATIENT
Start: 2023-01-01 | End: 2023-01-01

## 2023-01-01 RX ORDER — DILTIAZEM HCL/D5W 125 MG/125
5 PLASTIC BAG, INJECTION (ML) INTRAVENOUS
Status: DISCONTINUED | OUTPATIENT
Start: 2023-01-01 | End: 2023-01-01

## 2023-01-01 RX ORDER — LORAZEPAM 1 MG/1
1 TABLET ORAL
Status: DISCONTINUED | OUTPATIENT
Start: 2023-01-01 | End: 2023-01-01

## 2023-01-01 RX ORDER — DIPHENHYDRAMINE HCL 25 MG
25 CAPSULE ORAL EVERY 6 HOURS PRN
Status: DISCONTINUED | OUTPATIENT
Start: 2023-01-01 | End: 2023-01-01

## 2023-01-01 RX ORDER — CHLORHEXIDINE GLUCONATE 0.12 MG/ML
15 RINSE ORAL EVERY 12 HOURS SCHEDULED
Status: DISCONTINUED | OUTPATIENT
Start: 2023-01-01 | End: 2023-01-01

## 2023-01-01 RX ORDER — AMOXICILLIN 250 MG
2 CAPSULE ORAL 2 TIMES DAILY
Status: DISCONTINUED | OUTPATIENT
Start: 2023-01-01 | End: 2023-01-01

## 2023-01-01 RX ORDER — FENTANYL CITRATE-0.9 % NACL/PF 10 MCG/ML
50-300 PLASTIC BAG, INJECTION (ML) INTRAVENOUS CONTINUOUS PRN
Status: DISCONTINUED | OUTPATIENT
Start: 2023-01-01 | End: 2023-01-01

## 2023-01-01 RX ORDER — LORAZEPAM 2 MG/ML
0.5 INJECTION INTRAMUSCULAR
Status: DISCONTINUED | OUTPATIENT
Start: 2023-01-01 | End: 2023-01-01

## 2023-01-01 RX ORDER — ACETAMINOPHEN 650 MG/1
650 SUPPOSITORY RECTAL EVERY 4 HOURS PRN
Status: DISCONTINUED | OUTPATIENT
Start: 2023-01-01 | End: 2023-01-01 | Stop reason: HOSPADM

## 2023-01-01 RX ADMIN — PANTOPRAZOLE SODIUM 40 MG: 40 INJECTION, POWDER, LYOPHILIZED, FOR SOLUTION INTRAVENOUS at 08:19

## 2023-01-01 RX ADMIN — IPRATROPIUM BROMIDE AND ALBUTEROL SULFATE 3 ML: 2.5; .5 SOLUTION RESPIRATORY (INHALATION) at 06:37

## 2023-01-01 RX ADMIN — Medication 0.02 MCG/KG/MIN: at 11:31

## 2023-01-01 RX ADMIN — METHYLPREDNISOLONE SODIUM SUCCINATE 40 MG: 40 INJECTION, POWDER, FOR SOLUTION INTRAMUSCULAR; INTRAVENOUS at 22:21

## 2023-01-01 RX ADMIN — SENNOSIDES AND DOCUSATE SODIUM 2 TABLET: 50; 8.6 TABLET ORAL at 20:00

## 2023-01-01 RX ADMIN — SODIUM CHLORIDE, POTASSIUM CHLORIDE, SODIUM LACTATE AND CALCIUM CHLORIDE 1000 ML: 600; 310; 30; 20 INJECTION, SOLUTION INTRAVENOUS at 10:10

## 2023-01-01 RX ADMIN — INSULIN LISPRO 2 UNITS: 100 INJECTION, SOLUTION INTRAVENOUS; SUBCUTANEOUS at 20:26

## 2023-01-01 RX ADMIN — Medication 0.5 MCG/KG/MIN: at 01:32

## 2023-01-01 RX ADMIN — HYDROMORPHONE HYDROCHLORIDE 2 MG: 1 INJECTION, SOLUTION INTRAMUSCULAR; INTRAVENOUS; SUBCUTANEOUS at 21:20

## 2023-01-01 RX ADMIN — ENOXAPARIN SODIUM 40 MG: 100 INJECTION SUBCUTANEOUS at 20:04

## 2023-01-01 RX ADMIN — SODIUM CHLORIDE 40 ML: 0.9 INJECTION, SOLUTION INTRAVENOUS at 18:23

## 2023-01-01 RX ADMIN — DEXMEDETOMIDINE HYDROCHLORIDE 1.5 MCG/KG/HR: 400 INJECTION INTRAVENOUS at 12:25

## 2023-01-01 RX ADMIN — FUROSEMIDE 40 MG: 10 INJECTION, SOLUTION INTRAMUSCULAR; INTRAVENOUS at 11:31

## 2023-01-01 RX ADMIN — Medication 10 ML: at 20:16

## 2023-01-01 RX ADMIN — PHENYLEPHRINE HYDROCHLORIDE 6 MCG/KG/MIN: 10 INJECTION INTRAVENOUS at 01:32

## 2023-01-01 RX ADMIN — CEFEPIME 2 G: 2 INJECTION, POWDER, FOR SOLUTION INTRAVENOUS at 05:57

## 2023-01-01 RX ADMIN — CHLORHEXIDINE GLUCONATE 15 ML: 1.2 SOLUTION ORAL at 02:04

## 2023-01-01 RX ADMIN — HYDROMORPHONE HYDROCHLORIDE 2 MG: 1 INJECTION, SOLUTION INTRAMUSCULAR; INTRAVENOUS; SUBCUTANEOUS at 18:25

## 2023-01-01 RX ADMIN — LORAZEPAM 1 MG: 2 INJECTION INTRAMUSCULAR; INTRAVENOUS at 20:15

## 2023-01-01 RX ADMIN — IPRATROPIUM BROMIDE AND ALBUTEROL SULFATE 3 ML: 2.5; .5 SOLUTION RESPIRATORY (INHALATION) at 10:22

## 2023-01-01 RX ADMIN — LORAZEPAM 1 MG: 2 INJECTION INTRAMUSCULAR; INTRAVENOUS at 18:25

## 2023-01-01 RX ADMIN — ONDANSETRON 4 MG: 2 INJECTION INTRAMUSCULAR; INTRAVENOUS at 21:12

## 2023-01-01 RX ADMIN — CHLORHEXIDINE GLUCONATE 15 ML: 1.2 SOLUTION ORAL at 10:01

## 2023-01-01 RX ADMIN — GLYCOPYRROLATE 0.4 MG: 0.2 INJECTION INTRAMUSCULAR; INTRAVENOUS at 08:32

## 2023-01-01 RX ADMIN — INSULIN HUMAN 5.2 UNITS/HR: 1 INJECTION, SOLUTION INTRAVENOUS at 05:18

## 2023-01-01 RX ADMIN — CEFEPIME 2 G: 2 INJECTION, POWDER, FOR SOLUTION INTRAVENOUS at 05:17

## 2023-01-01 RX ADMIN — ONDANSETRON 4 MG: 2 INJECTION INTRAMUSCULAR; INTRAVENOUS at 19:48

## 2023-01-01 RX ADMIN — Medication 10 ML: at 08:19

## 2023-01-01 RX ADMIN — Medication 50 MCG/HR: at 09:16

## 2023-01-01 RX ADMIN — ATORVASTATIN CALCIUM 40 MG: 40 TABLET, FILM COATED ORAL at 20:00

## 2023-01-01 RX ADMIN — LORAZEPAM 1 MG: 2 INJECTION INTRAMUSCULAR; INTRAVENOUS at 06:26

## 2023-01-01 RX ADMIN — APIXABAN 5 MG: 5 TABLET, FILM COATED ORAL at 11:31

## 2023-01-01 RX ADMIN — Medication 10 ML: at 08:20

## 2023-01-01 RX ADMIN — LORAZEPAM 1 MG: 2 INJECTION INTRAMUSCULAR; INTRAVENOUS at 11:30

## 2023-01-01 RX ADMIN — APIXABAN 5 MG: 5 TABLET, FILM COATED ORAL at 20:15

## 2023-01-01 RX ADMIN — DIGOXIN 250 MCG: 0.25 INJECTION INTRAMUSCULAR; INTRAVENOUS at 09:16

## 2023-01-01 RX ADMIN — ATORVASTATIN CALCIUM 40 MG: 40 TABLET, FILM COATED ORAL at 20:15

## 2023-01-01 RX ADMIN — MORPHINE SULFATE 2 MG: 2 INJECTION, SOLUTION INTRAMUSCULAR; INTRAVENOUS at 19:52

## 2023-01-01 RX ADMIN — HYDROMORPHONE HYDROCHLORIDE 2 MG: 1 INJECTION, SOLUTION INTRAMUSCULAR; INTRAVENOUS; SUBCUTANEOUS at 08:32

## 2023-01-01 RX ADMIN — MIDAZOLAM 2 MG: 1 INJECTION INTRAMUSCULAR; INTRAVENOUS at 00:01

## 2023-01-01 RX ADMIN — METHYLPREDNISOLONE SODIUM SUCCINATE 40 MG: 40 INJECTION, POWDER, FOR SOLUTION INTRAMUSCULAR; INTRAVENOUS at 18:34

## 2023-01-01 RX ADMIN — Medication 10 ML: at 20:00

## 2023-01-01 RX ADMIN — ASPIRIN 81 MG: 81 TABLET, COATED ORAL at 08:19

## 2023-01-01 RX ADMIN — METHYLPREDNISOLONE SODIUM SUCCINATE 40 MG: 40 INJECTION, POWDER, FOR SOLUTION INTRAMUSCULAR; INTRAVENOUS at 04:10

## 2023-01-01 RX ADMIN — SENNOSIDES AND DOCUSATE SODIUM 2 TABLET: 50; 8.6 TABLET ORAL at 08:19

## 2023-01-01 RX ADMIN — IPRATROPIUM BROMIDE AND ALBUTEROL SULFATE 3 ML: 2.5; .5 SOLUTION RESPIRATORY (INHALATION) at 11:18

## 2023-01-01 RX ADMIN — GLYCOPYRROLATE 0.4 MG: 0.2 INJECTION INTRAMUSCULAR; INTRAVENOUS at 18:25

## 2023-01-01 RX ADMIN — SODIUM CHLORIDE 750 ML: 9 INJECTION, SOLUTION INTRAVENOUS at 06:22

## 2023-01-01 RX ADMIN — DEXMEDETOMIDINE HYDROCHLORIDE 0.5 MCG/KG/HR: 400 INJECTION INTRAVENOUS at 23:38

## 2023-01-01 RX ADMIN — DILTIAZEM HYDROCHLORIDE 10 MG: 5 INJECTION, SOLUTION INTRAVENOUS at 21:09

## 2023-01-01 RX ADMIN — IPRATROPIUM BROMIDE AND ALBUTEROL SULFATE 3 ML: 2.5; .5 SOLUTION RESPIRATORY (INHALATION) at 14:04

## 2023-01-01 RX ADMIN — CALCIUM GLUCONATE 2 G: 20 INJECTION, SOLUTION INTRAVENOUS at 02:04

## 2023-01-01 RX ADMIN — INSULIN LISPRO 2 UNITS: 100 INJECTION, SOLUTION INTRAVENOUS; SUBCUTANEOUS at 18:22

## 2023-01-01 RX ADMIN — MORPHINE SULFATE 2 MG: 2 INJECTION, SOLUTION INTRAMUSCULAR; INTRAVENOUS at 23:02

## 2023-01-01 RX ADMIN — BUDESONIDE 1 MG: 0.5 INHALANT RESPIRATORY (INHALATION) at 06:07

## 2023-01-01 RX ADMIN — PHENYLEPHRINE HYDROCHLORIDE 0.5 MCG/KG/MIN: 10 INJECTION INTRAVENOUS at 06:32

## 2023-01-01 RX ADMIN — MIDAZOLAM HYDROCHLORIDE 2 MG: 1 INJECTION INTRAMUSCULAR; INTRAVENOUS at 00:01

## 2023-01-01 RX ADMIN — METHYLPREDNISOLONE SODIUM SUCCINATE 40 MG: 40 INJECTION, POWDER, FOR SOLUTION INTRAMUSCULAR; INTRAVENOUS at 12:11

## 2023-01-01 RX ADMIN — IPRATROPIUM BROMIDE AND ALBUTEROL SULFATE 3 ML: 2.5; .5 SOLUTION RESPIRATORY (INHALATION) at 20:04

## 2023-01-01 RX ADMIN — CEFEPIME 2 G: 2 INJECTION, POWDER, FOR SOLUTION INTRAVENOUS at 18:33

## 2023-01-01 RX ADMIN — Medication 10 ML: at 01:33

## 2023-01-01 RX ADMIN — IPRATROPIUM BROMIDE AND ALBUTEROL SULFATE 3 ML: 2.5; .5 SOLUTION RESPIRATORY (INHALATION) at 06:07

## 2023-01-01 RX ADMIN — AMIODARONE HYDROCHLORIDE 150 MG: 1.5 INJECTION, SOLUTION INTRAVENOUS at 20:59

## 2023-01-01 RX ADMIN — LORAZEPAM 1 MG: 2 INJECTION INTRAMUSCULAR; INTRAVENOUS at 23:02

## 2023-01-01 RX ADMIN — SODIUM CHLORIDE, POTASSIUM CHLORIDE, SODIUM LACTATE AND CALCIUM CHLORIDE 250 ML: 600; 310; 30; 20 INJECTION, SOLUTION INTRAVENOUS at 20:27

## 2023-01-01 RX ADMIN — METHYLPREDNISOLONE SODIUM SUCCINATE 40 MG: 40 INJECTION, POWDER, FOR SOLUTION INTRAMUSCULAR; INTRAVENOUS at 16:41

## 2023-01-01 RX ADMIN — Medication 10 ML: at 09:59

## 2023-01-01 RX ADMIN — HEPARIN SODIUM 3582 UNITS: 5000 INJECTION INTRAVENOUS; SUBCUTANEOUS at 21:09

## 2023-01-01 RX ADMIN — AMIODARONE HYDROCHLORIDE 1 MG/MIN: 50 INJECTION, SOLUTION INTRAVENOUS at 21:09

## 2023-01-01 RX ADMIN — Medication 10 ML: at 10:04

## 2023-01-01 RX ADMIN — PHENYLEPHRINE HYDROCHLORIDE 2 MCG/KG/MIN: 10 INJECTION INTRAVENOUS at 00:04

## 2023-01-01 RX ADMIN — GLYCOPYRROLATE 0.2 MG: 0.2 INJECTION INTRAMUSCULAR; INTRAVENOUS at 06:22

## 2023-01-01 RX ADMIN — LORAZEPAM 1 MG: 2 INJECTION INTRAMUSCULAR; INTRAVENOUS at 06:34

## 2023-01-01 RX ADMIN — INSULIN LISPRO 2 UNITS: 100 INJECTION, SOLUTION INTRAVENOUS; SUBCUTANEOUS at 08:15

## 2023-01-01 RX ADMIN — PHENYLEPHRINE HYDROCHLORIDE 0.5 MCG/KG/MIN: 10 INJECTION INTRAVENOUS at 22:58

## 2023-01-01 RX ADMIN — Medication 5 MG/HR: at 21:09

## 2023-01-01 RX ADMIN — Medication 0.3 MCG/KG/MIN: at 20:15

## 2023-01-01 RX ADMIN — DIGOXIN 250 MCG: 0.25 INJECTION INTRAMUSCULAR; INTRAVENOUS at 22:21

## 2023-01-01 RX ADMIN — NITROGLYCERIN 5 MCG/MIN: 20 INJECTION INTRAVENOUS at 21:05

## 2023-01-01 RX ADMIN — PANTOPRAZOLE SODIUM 40 MG: 40 INJECTION, POWDER, LYOPHILIZED, FOR SOLUTION INTRAVENOUS at 08:16

## 2023-01-01 RX ADMIN — Medication 0.3 MCG/KG/MIN: at 18:25

## 2023-01-01 RX ADMIN — PROPOFOL INJECTABLE EMULSION 10 MCG/KG/MIN: 10 INJECTION, EMULSION INTRAVENOUS at 08:16

## 2023-01-01 RX ADMIN — VASOPRESSIN 0.03 UNITS/MIN: 0.2 INJECTION INTRAVENOUS at 00:02

## 2023-01-01 RX ADMIN — LORAZEPAM 1 MG: 2 INJECTION INTRAMUSCULAR; INTRAVENOUS at 08:32

## 2023-01-01 RX ADMIN — LORAZEPAM 1 MG: 2 INJECTION INTRAMUSCULAR; INTRAVENOUS at 23:03

## 2023-01-01 RX ADMIN — DEXMEDETOMIDINE HYDROCHLORIDE 1 MCG/KG/HR: 400 INJECTION INTRAVENOUS at 04:45

## 2023-03-16 PROBLEM — I21.02 ST ELEVATION MYOCARDIAL INFARCTION INVOLVING LEFT ANTERIOR DESCENDING (LAD) CORONARY ARTERY: Status: ACTIVE | Noted: 2023-01-01

## 2023-03-17 PROBLEM — E44.0 MODERATE MALNUTRITION: Status: ACTIVE | Noted: 2023-01-01

## 2023-03-17 NOTE — CONSULTS
"CARDIOLOGY CONSULT      Requesting Provider    Naren Andersen MD      PATIENT IDENTIFICATION    Name: Jamee Malone  Age: 78 y.o. Sex: female : 1945  MRN: [id number]    REASON FOR CONSULTATION:  STEMI    CHIEF COMPLAINT:  Shortness of breath    HISTORY OF PRESENT ILLNESS:   History is obtained predominantly from chart records as patient is a poor historian at the time of history and physical.    Per records this is an 78-year-old female brought in by EMS for shortness of breath.  She has a history of COPD.  ECG transmitted by EMS demonstrated a lateral ST segment elevation myocardial infarction.  Cath Lab was activated in advance of patient arrival.    In the emergency department the patient was noted to be in rapid atrial fibrillation.  The patient received diltiazem and nitroglycerin.     Patient is tachypneic and is only able to verbalize that she is short of breath.  She denies any pain.  Repeat EKG demonstrated improvement in ST segments although still some subtle lateral changes were noted.  Decision was made to proceed with cardiac catheterization.    IMPRESSIONS  Acute lateral ST segment elevation myocardial infarction  Oxygen dependent COPD    RECOMMENDATIONS:  Patient to be taken to cardiac catheterization lab  Routine STEMI protocol    Medical History    Past Medical History:   Diagnosis Date   • COPD (chronic obstructive pulmonary disease) (HCC)         Surgical History    No past surgical history on file.     Family History    No family history on file.    Social History    Social History     Tobacco Use   • Smoking status: Not on file   • Smokeless tobacco: Not on file   Substance Use Topics   • Alcohol use: Not on file        Allergies    No Known Allergies    REVIEW OF SYSTEMS:  Review of systems could not be obtained due to   patient confusion.    OBJECTIVE     VITAL SIGNS:  Visit Vitals  /76   Pulse 110   Temp 97.8 °F (36.6 °C)   Resp 24   Ht 162.6 cm (64\")   Wt 59.7 kg (131 lb 9.8 oz) " "  SpO2 (!) 81%   BMI 22.59 kg/m²     Oxygen Therapy  SpO2: (!) 81 %  Flowsheet Rows    Flowsheet Row First Filed Value   Admission Height 162.6 cm (64\") Documented at 03/16/2023 2105   Admission Weight 59.7 kg (131 lb 9.8 oz) Documented at 03/16/2023 2105        Intake & Output (last 3 days)     None        Lines, Drains & Airways     Active LDAs     Name Placement date Placement time Site Days    Peripheral IV 03/16/23 2105 Anterior;Left Forearm 03/16/23 2105  Forearm  less than 1    Peripheral IV 03/16/23 2107 Anterior;Proximal;Right Forearm 03/16/23 2107  Forearm  less than 1    Arterial Sheath 6 Fr. Right Femoral --  --  Femoral  --              /76   Pulse 110   Temp 97.8 °F (36.6 °C)   Resp 24   Ht 162.6 cm (64\")   Wt 59.7 kg (131 lb 9.8 oz)   SpO2 (!) 81%   BMI 22.59 kg/m²     INTAKE/OUTPUT:    Intake/Output this shift:  No intake/output data recorded.  Intake/Output last 3 shifts:  No intake/output data recorded.      PHYSICAL EXAM:    General: Alert, somewhat disoriented and acutely short of breath  Head:  Normocephalic, atraumatic, mucous membranes moist  Eyes:  Conjunctivae/corneas clear, EOM's intact     Neck:  Supple,  no bruit  Lungs: Coarse and diminished bilaterally.  Chest wall: No tenderness  Heart::  Regular rate and rhythm, S1 and S2 normal, 1/6 holosystolic murmur.  No rub or gallop  Abdomen: Soft, nontender, nondistended, bowel sounds active  Extremities: No edema.  Extremity cyanosis is noted.  Pulses: 2+ and symmetric all extremities  Skin:  No rashes or lesions.  Neuro/psych: A&O x3. CN II through XII are grossly intact with appropriate affect    Scheduled Meds:      aspirin, , ,   eptifibatide, , ,   heparin (porcine), , ,         Continuous Infusions:    dilTIAZem, 5 mg/hr  phenylephrine, 0.5-3 mcg/kg/min, Last Rate: 0.5 mcg/kg/min (03/16/23 2258)        PRN Meds:    •  sodium chloride     Data Review:     X-rays, labs reviewed personally by provider.    CBC    Results from " last 7 days   Lab Units 03/16/23 2207 03/16/23 2108   WBC 10*3/mm3  --  15.70*   HEMOGLOBIN g/dL  --  12.9   HEMOGLOBIN, POC g/dL 13.5  --    PLATELETS 10*3/mm3  --  310     Cr Clearance Estimated Creatinine Clearance: 68.2 mL/min (by C-G formula based on SCr of 0.62 mg/dL).  Coag   Results from last 7 days   Lab Units 03/16/23 2108   INR  0.96     HbA1C No results found for: HGBA1C  Blood Glucose   Glucose   Date/Time Value Ref Range Status   03/16/2023 2207 476 (C) 74 - 100 mg/dL Final   03/16/2023 2207 476 (C) 74 - 100 mg/dL Final     Comment:     Serial Number: [id number]:  [id number]   03/16/2023 2154 417 (C) 70 - 105 mg/dL Final     Comment:     Serial Number: [id number]:  [id number]     Infection     CMP   Results from last 7 days   Lab Units 03/16/23 2108   SODIUM mmol/L 133*   POTASSIUM mmol/L 4.5   CHLORIDE mmol/L 96*   CO2 mmol/L 25.0   BUN mg/dL 10   CREATININE mg/dL 0.62   GLUCOSE mg/dL 254*   ALBUMIN g/dL 3.9   BILIRUBIN mg/dL 0.2   ALK PHOS U/L 82   AST (SGOT) U/L 17   ALT (SGPT) U/L 10     ABG    Results from last 7 days   Lab Units 03/16/23 2207   PH, ARTERIAL pH units 6.838*   PCO2, ARTERIAL mm Hg 140.2*   PO2 ART mm Hg 175.1*   O2 SATURATION ART % 97.3   BASE EXCESS ART mmol/L -13.1*     UA      ML  No results found for: POCMETH, POCAMPHET, POCBARBITUR, POCBENZO, POCCOCAINE, POCOPIATES, POCOXYCODO, POCPHENCYC, POCPROPOXY, POCTHC, POCTRICYC  Lysis Labs   Results from last 7 days   Lab Units 03/16/23 2207 03/16/23 2108   INR   --  0.96   HEMOGLOBIN g/dL  --  12.9   HEMOGLOBIN, POC g/dL 13.5  --    PLATELETS 10*3/mm3  --  310   CREATININE mg/dL  --  0.62     Radiology(recent) XR Chest 1 View    Result Date: 3/16/2023  Impression: Interstitial disease in the lungs but could reflect chronic disease or mild interstitial edema. Electronically Signed: Aldo Sanchez  3/16/2023 9:23 PM EDT  Workstation ID: [id number]        Results from last 7 days   Lab Units 03/16/23 2108   HSTROP T ng/L 38*        ECG/EMG Results (most recent)     Procedure Component Value Units Date/Time    ECG 12 Lead Chest Pain [[id number]] Collected: 03/16/23 2101     Updated: 03/16/23 2102     QT Interval 315 ms     Narrative:      HEART RATE= 151  bpm  RR Interval= 398  ms  UT Interval=   ms  P Horizontal Axis=   deg  P Front Axis=   deg  QRSD Interval= 89  ms  QT Interval= 315  ms  QRS Axis= -50  deg  T Wave Axis= 85  deg  - ABNORMAL ECG -  Atrial fibrillation with rapid V-rate  Ventricular premature complex  Left anterior fascicular block  Extensive anterior infarct, acute (LAD)  Electronically Signed By:   Date and Time of Study: 2023-03-16 21:01:18    ECG 12 Lead Chest Pain [[id number]] Collected: 03/16/23 2221     Updated: 03/16/23 2222     QT Interval 299 ms     Narrative:      HEART RATE= 136  bpm  RR Interval= 440  ms  UT Interval= 147  ms  P Horizontal Axis= 248  deg  P Front Axis= 88  deg  QRSD Interval= 86  ms  QT Interval= 299  ms  QRS Axis= -59  deg  T Wave Axis= 93  deg  - ABNORMAL ECG -  Sinus tachycardia  Left anterior fascicular block  Anterior infarct, old  Lateral wall also involved  When compared with ECG of 16-Mar-2023 21:01:18,  Significant change in rhythm: previously atrial fibrillation  New or worsened ischemia or infarction  Electronically Signed By:   Date and Time of Study: 2023-03-16 22:21:45          Imaging:  Imaging Results (Last 72 Hours)     Procedure Component Value Units Date/Time    XR Chest 1 View [[id number]] Collected: 03/16/23 2123     Updated: 03/16/23 2125    Narrative:      XR CHEST 1 VW    Date of Exam: 3/16/2023 9:13 PM EDT    Indication: Acute STEMI Protocol.    Comparison: None available.    Findings:  There is interstitial prominence. There is no pneumothorax, pleural effusion or focal airspace consolidation. Heart size and pulmonary vasculature appear within normal limits. Regional bones appear intact.      Impression:      Impression:  Interstitial disease in the lungs but could  reflect chronic disease or mild interstitial edema.    Electronically Signed: Aldo Sanchez    3/16/2023 9:23 PM EDT    Workstation ID: [id number]            Juan Noonan DO  03/16/23  23:04 EDT

## 2023-03-17 NOTE — H&P
History and Physical   Jamee Malone : 1945 MRN:[id number] LOS:1     Reason for admission: ST elevation myocardial infarction involving left anterior descending (LAD) coronary artery (HCC)     Assessment / Plan     Acute respiratory failure with hypoxia and hypercapnia:   · Likely due to COPD exacerbation  · Ventilator settings noted and adjusted as needed.   · ABG with improved pH.  Will wean off sedation and attempt a spontaneous breathing trial when more awake.  · Minimize sedation/analgesia to keep RASS of 0 to -1.    Acute COPD exacerbation / On home oxygen:   • Unclear precipitating factors.  Chest x-ray with no obvious infiltrate.  • Started on iv steroids and duonebs Q4hrs.     Shock  · Unclear etiology, suspect cardiogenic shock vs septic shock.  · Echo images reviewed, normal LVEF.  Appears under resuscitated.  Likely hypovolemic shock.  · We will give additional 1 L fluid bolus.  Weaning off vasopressors.  · Currently on empiric cefepime.  MRSA screen negative. Check procalcitonin.  Sputum cultures growing gram-positive cocci and gram-negative rods.    STEMI  · Troponins peaked at 632, cardiology following.  · Cardiac cath showed RCA disease with moderate coronary occlusion involving mid LAD, no interventions needed.  · Follow echocardiogram.  · We will start aspirin and statins.  Eventually will add beta-blockers when blood pressure permits.    Chronic atrial fibrillation :   • Rate controlled well without any medications at this time.  • Cardiology following.  Check TFTs.  • CHADS-VASc score of at least 5.  Eventually will start Eliquis once extubated.    Diabetes mellitus Type 2, not insulin-dependent : well controlled.   • Takes metformin at home.  Currently on insulin drip.  • Accu checks ACHS and c/w humalog coverage as needed.   • Check an A1c    Essential Hypertension: well controlled.   • Home HCTZ on hold.  • Restart medications as needed.    Diabetic neuropathy: Gabapentin on hold.  Level  Of Support Discussed With: Patient  Code Status (Patient has no pulse and is not breathing): CPR (Attempt to Resuscitate)  Medical Interventions (Patient has pulse or is breathing): Full Support  Release to patient: Routine Release       Nutrition: NPO Diet NPO Type: Strict NPO     DVT Prophylaxis:   Mechanical Order History:      Ordered        03/16/23 2324  Place Sequential Compression Device  Once            03/16/23 2324  Maintain Sequential Compression Device  Continuous                    Pharmalogical Order History:      Ordered     Dose Route Frequency Stop    03/17/23 0934  Enoxaparin Sodium (LOVENOX) syringe 40 mg         40 mg SC Every 24 Hours --    03/16/23 2113  heparin (porcine) 5000 UNIT/ML injection         -- IV Code / Trauma / Sedation Medication 03/16/23 2109 03/16/23 2038  heparin (porcine) 1000 UNIT/ML injection  - ADS Override Pull        Note to Pharmacy: Created by cabinet override    -- -- -- 03/17/23 0896                 History of Present illness     A 78 y.o. old female patient with PMH of COPD presents to the hospital with complaints of shortness of breath.  EKG showed ST elevation and cardiology was consulted.  Had an emergent cardiac cath which showed RCA disease with moderate coronary occlusion involving mid LAD, no interventions needed.    Also found to be in A-fib with RVR on admission, treated with Cardizem.    ED course: ABG showed pH of 6.8 with a PCO2 of 140, intubated for hypercapnic respiratory failure.  RVP was negative.  Started on empiric antibiotics for suspected pneumonia, also treated COPD exacerbation with steroids and bronchodilators.    Subjective / Review of systems     Review of Systems   Intubated and sedated.  Past Medical/Surgical/Social/Family History & Allergies     Past Medical History:   Diagnosis Date   • COPD (chronic obstructive pulmonary disease) (HCC)       No pertinent surgical history.  Social History     Socioeconomic History   • Marital status:  "Unknown   Tobacco Use   • Smoking status: Every Day     Types: Cigarettes   Vaping Use   • Vaping Use: Never used   Substance and Sexual Activity   • Alcohol use: Yes     Alcohol/week: 10.0 standard drinks     Types: 10 Cans of beer per week   • Drug use: Yes     Frequency: 7.0 times per week     Types: Marijuana     Comment: Per daughter, 5-6 \"bowls\" per day.      History reviewed. No pertinent family history.   No Known Allergies     Home Medications     Prior to Admission medications    Medication Sig Start Date End Date Taking? Authorizing Provider   gabapentin (NEURONTIN) 300 MG capsule Take 1 capsule by mouth 3 (Three) Times a Day.    Patricia Huynh MD   hydroCHLOROthiazide (HYDRODIURIL) 25 MG tablet Take 1 tablet by mouth Daily.    Patricia Huynh MD   metFORMIN (GLUCOPHAGE) 1000 MG tablet Take 1 tablet by mouth 2 (Two) Times a Day With Meals.    Patricia Huynh MD   potassium chloride (MICRO-K) 10 MEQ CR capsule Take 1 capsule by mouth Daily.    Patricia Huynh MD      Objective / Physical Exam   Vital signs:  Temp: 96.5 °F (35.8 °C)  BP: 119/88  Heart Rate: 88  Resp: 24  SpO2: 100 %  Weight: 57.6 kg (127 lb)    Admission Weight: Weight: 59.7 kg (131 lb 9.8 oz)    Physical Exam  Vitals and nursing note reviewed.   Constitutional:       Interventions: She is sedated and intubated.   HENT:      Head: Normocephalic and atraumatic.      Mouth/Throat:      Mouth: Mucous membranes are moist.      Comments: ET tube in place  Eyes:      General: No scleral icterus.     Conjunctiva/sclera: Conjunctivae normal.   Cardiovascular:      Rate and Rhythm: Normal rate.      Pulses: Normal pulses.      Heart sounds: No murmur heard.    No gallop.   Pulmonary:      Effort: Pulmonary effort is normal. She is intubated.      Breath sounds: Normal breath sounds. No wheezing or rales.   Abdominal:      General: Bowel sounds are normal.      Palpations: Abdomen is soft.      Tenderness: There is no " abdominal tenderness.   Musculoskeletal:      Right lower leg: No edema.      Left lower leg: No edema.   Neurological:      Comments: Withdraws to stimuli        Labs     Results from last 7 days   Lab Units 03/16/23  2337 03/16/23  2315 03/16/23 2207 03/16/23 2108   WBC 10*3/mm3 25.80*  --   --  15.70*   HEMATOCRIT % 36.9  --   --  39.9   HEMATOCRIT POC %  --  34* 40  --    PLATELETS 10*3/mm3 317  --   --  310      Results from last 7 days   Lab Units 03/16/23 2337 03/16/23 2108   SODIUM mmol/L 134* 133*   POTASSIUM mmol/L 4.9 4.5   CHLORIDE mmol/L 97* 96*   CO2 mmol/L 22.0 25.0   BUN mg/dL 12 10   CREATININE mg/dL 0.79 0.62        Imaging     Chest X ray: My independent assessment showed emphysematous changes, no obvious infiltrates.    EKG: My independent evaluation showed atrial fibrillation, ST elevation in the lateral leads.    Current Medications   Scheduled Meds:aspirin, 81 mg, Oral, Daily  atorvastatin, 40 mg, Oral, Nightly  cefepime, 2 g, Intravenous, Q12H  chlorhexidine, 15 mL, Mouth/Throat, Q12H  enoxaparin, 40 mg, Subcutaneous, Q24H  ipratropium-albuterol, 3 mL, Nebulization, 4x Daily - RT  lactated ringers, 1,000 mL, Intravenous, Once  methylPREDNISolone sodium succinate, 40 mg, Intravenous, Q6H  pantoprazole, 40 mg, Intravenous, Q24H  senna-docusate sodium, 2 tablet, Nasogastric, BID  sodium chloride, 10 mL, Intravenous, Q12H  sodium chloride, 10 mL, Intravenous, Q12H         Continuous Infusions:dexmedetomidine, 0.2-1.5 mcg/kg/hr  fentanyl 10 mcg/mL,  mcg/hr, Last Rate: 25 mcg/hr (03/17/23 1001)  insulin, 0-100 Units/hr, Last Rate: 2.5 Units/hr (03/17/23 0948)  Pharmacy to Dose Cefepime,   phenylephrine, 0.5-3 mcg/kg/min, Last Rate: 1.6 mcg/kg/min (03/17/23 0745)  propofol, 5-50 mcg/kg/min, Last Rate: 20 mcg/kg/min (03/17/23 1003)         Anton Samuels MD  Critical Care  03/17/23   10:31 EDT

## 2023-03-17 NOTE — CONSULTS
"Nutrition Services    Patient Name: Jamee Malone  YOB: 1945  MRN: [id number]  Admission date: 3/16/2023    Comment:    Consult RD for tube feeding if pt is unable to extubate       Moderate chronic disease related malnutrition related to suspected decreased intake in setting of chronic disease complicated by acute respiratory failure as evidenced by physical exam revealing moderate muscle wasting and fat loss     PPE Documentation        PPE Worn By Provider mask, gloves and eye protection   PPE Worn By Patient  None      CLINICAL NUTRITION ASSESSMENT      Reason for Assessment 3/17: ventilated, TF workup      H&P      Past Medical History:   Diagnosis Date   • COPD (chronic obstructive pulmonary disease) (HCC)        No past surgical history on file.     Current Problems   Acute respiratory failure  COPD exacerbation  Shock  STEMI  Chronic A fib  T2DM     Encounter Information        Trending Narrative     3/17: Pt discussed during AM rounds. Pt on vent, agitated per RN. Plan to try to wean of sedation and extubate. If unable to extubate will initiate TF. NFPE completed, consistent with nutrition diagnosis of malnutrition using AND/ASPEN criteria. See MSA below.        Anthropometrics        Current Height, Weight Height: 162.6 cm (64\")  Weight: 57.6 kg (127 lb) (03/17/23 0918)       Ideal Body Weight (IBW) 120 lb   Usual Body Weight (UBW) UTD       Trending Weight Hx     This admission: 3/17: 127 lb             PTA: 3/17: no extensive wt hx available     Wt Readings from Last 30 Encounters:   03/17/23 0918 57.6 kg (127 lb)   03/17/23 0400 57.7 kg (127 lb 3.3 oz)   03/17/23 0300 57.7 kg (127 lb 3.3 oz)   03/16/23 2105 59.7 kg (131 lb 9.8 oz)      BMI kg/m2 Body mass index is 21.8 kg/m².       Labs        Pertinent Labs    Results from last 7 days   Lab Units 03/16/23  2337 03/16/23  2108   SODIUM mmol/L 134* 133*   POTASSIUM mmol/L 4.9 4.5   CHLORIDE mmol/L 97* 96*   CO2 mmol/L 22.0 25.0   BUN mg/dL 12 " 10   CREATININE mg/dL 0.79 0.62   CALCIUM mg/dL 7.5* 8.9   BILIRUBIN mg/dL 0.2 0.2   ALK PHOS U/L 71 82   ALT (SGPT) U/L 18 10   AST (SGOT) U/L 34* 17   GLUCOSE mg/dL 435* 254*     Results from last 7 days   Lab Units 03/16/23  2337 03/16/23  2207 03/16/23  2108   MAGNESIUM mg/dL 1.9  --  2.0   PHOSPHORUS mg/dL 8.0*  --   --    HEMOGLOBIN g/dL 11.7*  --  12.9   HEMOGLOBIN, POC   --    < >  --    HEMATOCRIT % 36.9  --  39.9   HEMATOCRIT POC   --    < >  --    TRIGLYCERIDES mg/dL 83  --   --     < > = values in this interval not displayed.     COVID19   Date Value Ref Range Status   03/17/2023 Not Detected Not Detected - Ref. Range Final     Lab Results   Component Value Date    HGBA1C 5.70 (H) 03/16/2023        Medications    Scheduled Medications aspirin, 81 mg, Oral, Daily  atorvastatin, 40 mg, Oral, Nightly  cefepime, 2 g, Intravenous, Q12H  chlorhexidine, 15 mL, Mouth/Throat, Q12H  enoxaparin, 40 mg, Subcutaneous, Q24H  ipratropium-albuterol, 3 mL, Nebulization, 4x Daily - RT  methylPREDNISolone sodium succinate, 40 mg, Intravenous, Q6H  pantoprazole, 40 mg, Intravenous, Q24H  senna-docusate sodium, 2 tablet, Nasogastric, BID  sodium chloride, 10 mL, Intravenous, Q12H  sodium chloride, 10 mL, Intravenous, Q12H        Infusions dexmedetomidine, 0.2-1.5 mcg/kg/hr  fentanyl 10 mcg/mL,  mcg/hr, Last Rate: 25 mcg/hr (03/17/23 1001)  insulin, 0-100 Units/hr, Last Rate: 2.5 Units/hr (03/17/23 0948)  Pharmacy to Dose Cefepime,   phenylephrine, 0.5-3 mcg/kg/min, Last Rate: 1.6 mcg/kg/min (03/17/23 0745)  propofol, 5-50 mcg/kg/min, Last Rate: 20 mcg/kg/min (03/17/23 1003)        PRN Medications •  acetaminophen **OR** acetaminophen  •  atropine  •  dexmedetomidine  •  dextrose  •  dextrose  •  fentaNYL  •  fentanyl 10 mcg/mL  •  fentaNYL citrate (PF)  •  glucagon (human recombinant)  •  ondansetron **OR** ondansetron  •  Pharmacy to Dose Cefepime  •  polyethylene glycol  •  propofol  •  sodium chloride  •  sodium  "chloride  •  sodium chloride  •  sodium chloride  •  sodium chloride     Physical Findings        Trending Physical   Appearance, NFPE 3/17: NFPE completed, consistent with nutrition diagnosis of malnutrition using AND/ASPEN criteria. See MSA below.      --  Edema  None      Bowel Function No BM since admission x 1 day    Tubes Intubated    Chewing/Swallowing UTD   Skin  intact        Estimated/Assessed Needs    3/17   Energy Requirements    Height for Calculation  Height: 162.6 cm (64\")   Weight for Calculation 58 kg    Method for Estimation  [employer] [facility] 2003b-30 kcal/kg   EST Needs (kcal/day) 1328-1672 kcal/day        Protein Requirements    Weight for Calculation 58 kg   EST Protein Needs (g/kg) 1.2 g/kg   EST Daily Needs (g/day) 70 g/day        Fluid Requirements     Estimated Needs (mL/day)        Fluid Deficit    Current Na Level (mEq/L)    Desired Na Level (mEq/L)    Estimated Fluid Deficit (L)       Current Nutrition Orders & Evaluation of Intake       Oral Nutrition     Food Allergies NKFA   Current PO Diet NPO Diet NPO Type: Strict NPO   Supplement N/a    PO Evaluation     Trending % PO Intake 3/17: NPO      Enteral Nutrition    Enteral Route    TF Modular    TF Delivery Method    Current TF Order    Current Water Flush     TF Residual/Tolerance     TF Observation         Parenteral Nutrition     TPN Route    Current TPN Order    Lipids (mL/%/frequency)     MVI Frequency     Trace Element Frequency     Total # Days on TPN    TPN Observation         Nutrition Course Details    PO Diets:    Nutrition Support:      Nutritional Risk Screening        NRS-2002 Score          Nutrition Diagnosis         Nutrition Dx Problem 1 Moderate chronic disease related malnutrition related to suspected decreased intake in setting of chronic disease complicated by acute respiratory failure as evidenced by physical exam revealing moderate muscle wasting and fat loss       Nutrition Dx Problem 2        Intervention Goal         " Intervention Goal(s) Advance diet or begin EN     Nutrition Intervention        RD Action Will monitor for ability to advance diet, if unable to advance will initiate EN      Nutrition Prescription          Diet Prescription NPO   Supplement Prescription N/a      Enteral Prescription Initial Goal:  *initial goal conservative d/t risk of RFS     Nutren 1.5 at 20 mL/hr + 10 mL q 8 hr water flush      End Goal:    Nutren 1.5 at 45 mL/hr + water flush per clinical picture     Calories  1486 kcals (within range)    Protein  67 g (96%)    Free water  752 mL    Flushes  Per clinical picture      The above end goal rate is for 22 hrs/day to assume interruptions for ADLs. Water flushes adjusted based on clinical picture + Rx flushes/IV fluids          TPN Prescription      Monitor/Evaluation        Monitor Weight, Skin status, GI status     Malnutrition Severity Assessment      Patient meets criteria for : Moderate (non-severe) Malnutrition  Malnutrition Type (last 8 hours)     Malnutrition Severity Assessment     Row Name 03/17/23 1128       Malnutrition Severity Assessment    Malnutrition Type Chronic Disease - Related Malnutrition    Row Name 03/17/23 1128       Muscle Loss    Loss of Muscle Mass Findings Moderate    Jew Region Moderate - slight depression    Clavicle Bone Region Moderate - some protrusion in females, visible in males    Patellar Region Moderate - patella more prominent, less muscle definition around patella    Anterior Thigh Region Severe - line/depression along thigh, obviously thin    Posterior Calf Region Moderate - some roundness, slight firmness    Row Name 03/17/23 1128       Fat Loss    Subcutaneous Fat Loss Findings Moderate    Orbital Region  Moderate -  somewhat hollowness, slightly dark circles    Upper Arm Region Moderate - some fat tissue, not ample    Row Name 03/17/23 1128       Criteria Met (Must meet criteria for severity in at least 2 of these categories: M Wasting, Fat Loss, Fluid,  Secondary Signs, Wt. Status, Intake)    Patient meets criteria for  Moderate (non-severe) Malnutrition                       Electronically signed by:  Gaviota Muir RD  03/17/23 11:16 EDT

## 2023-03-17 NOTE — ED PROVIDER NOTES
Subjective   History of Present Illness  Patient is a 78-year-old female brought in by EMS due to shortness of breath.  Patient does have a history of COPD.  Patient also did transmit an EKG to us that did show lateral ST elevation MI.  The cath team was notified prior to the patient arriving to the ED however they are not on location at this point yet.        Review of Systems    No past medical history on file.    No Known Allergies    No past surgical history on file.    No family history on file.    Social History     Socioeconomic History   • Marital status: Unknown           Objective   Physical Exam  Neck has no adenopathy JVD or bruits.  Lungs have extra wheezes throughout.  Heart has a regular rhythm irregular rate rhythm is tachycardic without murmur or gallop.  Abdomen is soft nontender.  Extremities M is no cyanosis or edema.  Procedures     EKG interpretation was a fibrillation with RVR with lateral ST elevation.  Heart rate is 140.      ED Course            Results for orders placed or performed during the hospital encounter of 03/16/23   Protime-INR    Specimen: Blood   Result Value Ref Range    Protime 9.9 9.6 - 11.7 Seconds    INR 0.96 0.93 - 1.10   CBC Auto Differential    Specimen: Blood   Result Value Ref Range    WBC 15.70 (H) 3.40 - 10.80 10*3/mm3    RBC 4.68 3.77 - 5.28 10*6/mm3    Hemoglobin 12.9 12.0 - 15.9 g/dL    Hematocrit 39.9 34.0 - 46.6 %    MCV 85.3 79.0 - 97.0 fL    MCH 27.6 26.6 - 33.0 pg    MCHC 32.3 31.5 - 35.7 g/dL    RDW 13.6 12.3 - 15.4 %    RDW-SD 42.4 37.0 - 54.0 fl    MPV 7.7 6.0 - 12.0 fL    Platelets 310 140 - 450 10*3/mm3    Neutrophil % 56.5 42.7 - 76.0 %    Lymphocyte % 34.0 19.6 - 45.3 %    Monocyte % 7.0 5.0 - 12.0 %    Eosinophil % 1.9 0.3 - 6.2 %    Basophil % 0.6 0.0 - 1.5 %    Neutrophils, Absolute 8.90 (H) 1.70 - 7.00 10*3/mm3    Lymphocytes, Absolute 5.40 (H) 0.70 - 3.10 10*3/mm3    Monocytes, Absolute 1.10 (H) 0.10 - 0.90 10*3/mm3    Eosinophils, Absolute 0.30  0.00 - 0.40 10*3/mm3    Basophils, Absolute 0.10 0.00 - 0.20 10*3/mm3    nRBC 0.2 0.0 - 0.2 /100 WBC   ECG 12 Lead Chest Pain   Result Value Ref Range    QT Interval 315 ms     XR Chest 1 View    Result Date: 3/16/2023  Impression: Interstitial disease in the lungs but could reflect chronic disease or mild interstitial edema. Electronically Signed: Aldo Daniel  3/16/2023 9:23 PM EDT  Workstation ID: [id number]                                      Medical Decision Making  My interpretation the patient's chest x-ray shows changes consistent with COPD.  EKG shows lateral ST elevation.  Laboratory data is pending at this time.  Patient was started on Cardizem both bolus and drip for A-fib with RVR.  Patient had STEMI orders initiated.  Patient was transferred to the cardiac intervention suite for intervention.  Total critical care time 30 minutes    Amount and/or Complexity of Data Reviewed  Labs: ordered. Decision-making details documented in ED Course.  Radiology: ordered and independent interpretation performed.  ECG/medicine tests: ordered and independent interpretation performed.      Risk  Prescription drug management.  Drug therapy requiring intensive monitoring for toxicity.          Final diagnoses:   ST elevation myocardial infarction involving left anterior descending (LAD) coronary artery (HCC)   Atrial fibrillation with RVR (HCC)   COPD exacerbation (HCC)       ED Disposition  ED Disposition     ED Disposition   Send to Cath Lab    Condition   --    Comment   --             No follow-up provider specified.       Medication List      No changes were made to your prescriptions during this visit.          Naren Andersen MD  03/16/23 3248

## 2023-03-17 NOTE — NURSING NOTE
Pt admitted to ICU on vent at 40% FiO2 and 5 PEEP. Edni gtt on and maxed.   CVC placed by APRN.   Levophed and Vaso added briefly.   EKG completed for ST elevation - see results.   Cardiologist called by APRN for results. No intervention at this time.       BP stabilized. Levophed and Vaso gtts titrated off.   Edin now on.     Arterial sheath remains in place per Cardiology.     Family at bedside and discussed pt's status in length with APRN. Code status changed to DNR.     VSS at this time.

## 2023-03-17 NOTE — CASE MANAGEMENT/SOCIAL WORK
Discharge Planning Assessment  [facility]     Patient Name: Jamee Malone  MRN: [id number]  Today's Date: 3/17/2023    Admit Date: 3/16/2023    Plan: Pending clinical progress, pending PT/OT evaluations once medically stable. LSW following.   Discharge Needs Assessment     Row Name 03/17/23 1256       Living Environment    People in Home child(megha), adult    Name(s) of People in Home [facility]    Current Living Arrangements home    Primary Care Provided by self    Provides Primary Care For no one    Family Caregiver if Needed child(megha), adult;other relative(s)    Family Caregiver Names Dgh-Magda & Aunt-Yael    Quality of Family Relationships helpful;involved;supportive    Able to Return to Prior Arrangements yes       Resource/Environmental Concerns    Resource/Environmental Concerns none    Transportation Concerns none       Transition Planning    Patient/Family Anticipates Transition to home with help/services    Patient/Family Anticipated Services at Transition none    Transportation Anticipated family or friend will provide;other (see comments)  Aunt or Verida       Discharge Needs Assessment    Readmission Within the Last 30 Days no previous admission in last 30 days    Equipment Currently Used at Home oxygen  3L-[employer] (concentrator, portable tanks, Inogen)    Anticipated Changes Related to Illness none    Equipment Needed After Discharge none    Discharge Facility/Level of Care Needs nursing facility, skilled;rehabilitation facility    Provided Post Acute Provider List? Yes    Post Acute Provider List Home Health;Inpatient Rehab    Delivered To Support Person    Method of Delivery Telephone;Other (comment)  Reviewed HH and IPR lists to dgh and aunt, left copies at bedside.               Discharge Plan     Row Name 03/17/23 1258       Plan    Plan Pending clinical progress, pending PT/OT evaluations once medically stable. LSW following.    Patient/Family in Agreement with Plan yes    Plan Comments Patient remains  on ventilator. CM spoke to patient's daughter Magda via telephone, also on the phone was patient's Aunt Yael. Patient lives at home with daughter Magda, CM confirmed address with daughter and updated chart accordingly. Patient will need transportation set up at discharge through [employer] if going home. Per daughter patient performs ADL with minimal assistance from daughter, patient does not use any DME. Patient wears 3L O2 at home through [employer] and has an Inogen tank. PCP and pharmacy confirmed. Daughter denies financial assistance needs for food and/or medication. CM informed daughter that pending patient's clinical progress will determine if rehab or HH will be needed. Daughter became tearful during conversation and gave phone to patient's Aunt Yael. CM  informed Yael HH and rehab list will be at patient's bedside to review once patient is medically stable for discharge. Yael verbalized understanding. CM consulted LSW due to ETOH and THC use, LSW will follow up with patient once appropriate. D/C barriers: vent, sedation gtts, dorothy gtt, insulin gtt, CVC, sheath, pending CT head, pending DANAE, pending blood cultures, pending possible weaning of vent today, 3/17.               Demographic Summary     Row Name 03/17/23 1259       General Information    Admission Type inpatient    Arrived From emergency department    Required Notices Provided Important Message from Medicare    Referral Source admission list    Reason for Consult discharge planning    Preferred Language English               Functional Status     Row Name 03/17/23 0866       Functional Status    Usual Activity Tolerance moderate  Per dgh Magda    Current Activity Tolerance other (see comments)  SANDRA; ventilator       Functional Status, IADL    Medications independent    Meal Preparation assistive person    Housekeeping assistive person    Laundry assistive person    Shopping assistive person                    Patient Forms     Row Name 03/17/23 0099        Patient Forms    Important Message from Medicare (IMM) Delivered  IMM reviewed with daughter, verbalized understanding, copy left at bedside    Delivered to Support person    Method of delivery Telephone              Phone communication or documentation only - no physical contact with patient or family.      Qi Mccallum RN

## 2023-03-17 NOTE — PLAN OF CARE
Goal Outcome Evaluation:           Progress: declining   See previous nursing note.     Insulin gtt added.   Pan cultures sent - blood, urine, sputum.     CVP set up - reading of 10 at 0600.   IVF bolus of 750mL running.     Minimal UO.     Pt very agitated with stimulation, but is able to follow commands. Precedex gtt on.   Edin still on.     VSS at this time.   No needs or distress from pt apparent at this time.

## 2023-03-17 NOTE — PROCEDURES
Insert Central Line At Bedside    Date/Time: 3/17/2023 12:00 AM  Performed by: Lauar Giles APRN  Authorized by: Laura Giles APRN   Consent: Verbal consent obtained. Written consent obtained.  Risks and benefits: risks, benefits and alternatives were discussed  Consent given by: abdelrahman ORDOÑEZ.  Patient identity confirmed: hospital-assigned identification number    Anesthesia:  Local Anesthetic: lidocaine 1% without epinephrine  Preparation: skin prepped with 2% chlorhexidine  Skin prep agent dried: skin prep agent completely dried prior to procedure  Sterile barriers: all five maximum sterile barriers used - cap, mask, sterile gown, sterile gloves, and large sterile sheet  Hand hygiene: hand hygiene performed prior to central venous catheter insertion  Location details: left femoral  Patient position: flat  Catheter type: triple lumen  Catheter size: 12 Fr  Ultrasound guidance: yes  Sterile ultrasound techniques: sterile gel and sterile probe covers were used  Number of attempts: 1  Successful placement: yes  Post-procedure: line sutured and dressing applied  Assessment: blood return through all ports  Patient tolerance: patient tolerated the procedure well with no immediate complications

## 2023-03-17 NOTE — CASE MANAGEMENT/SOCIAL WORK
Social Work Assessment   Jose     Patient Name: Jamee Malone  MRN: [id number]  Today's Date: 3/17/2023    Admit Date: 3/16/2023     Substance Abuse     Row Name 03/17/23 1027       Substance Use    Substance Use Status current alcohol use;current street drug/inhalant/medication abuse    Substance Use Comment SW was consulted re: etoh and marijuana abuse. Pt currently intubated. SW to f/u when clinically appropriate.              No physical contact with patient on this date.    STEVEN Alas, W  Medical Social Worker  Ph [phone]  Fax [phone]  [email or web address]

## 2023-03-17 NOTE — PLAN OF CARE
Goal Outcome Evaluation:  Plan of Care Reviewed With: patient, daughter        Progress: improving   Pt remains on Precedex and the ventilator 40% /5 PEEP. Easily arouses very agitated and pulling at lines and tubes. Restraints in place per orders. Remains on Edin gtt to keep MAP >65. CVP 11-13. Insulin gtt continues as ordered. Family updated on changes. No arrhythmias noted this shift. HR 's. No s/s of pain. Weaninbg parameters obtained to assess for possible extubation.

## 2023-03-17 NOTE — PROGRESS NOTES
Cardiology [facility]      Patient Care Team:  Edie Donahue NP as PCP - General (Nurse Practitioner)      78-year-old female brought in by EMS for shortness of breath.  She has a history of COPD.  ECG transmitted by EMS demonstrated a lateral ST segment elevation myocardial infarction.  Cath Lab was activated in advance of patient arrival.     In the emergency department the patient was noted to be in rapid atrial fibrillation.  The patient received diltiazem and nitroglycerin.      Patient is tachypneic and is only able to verbalize that she is short of breath.  She denies any pain.  Repeat EKG demonstrated improvement in ST segments although still some subtle lateral changes were noted.  Decision was made to proceed with cardiac catheterization.         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  No flow-limiting lesion is identified that would explain the lateral ST segment elevation noted on the EMS ECG.    The ECG performed in the ER appeared much better but still demonstrated some subtle ST segment elevation in the lateral leads.    EKG performed in the Cath Lab demonstrates complete resolution of the ST segment elevation.  It certainly possible that there was a thrombus in one of the vessels may be the mid LAD that resolved.  Nonetheless, no indication at the present time for PCI given altered mentation.  Altered mentation appears to be related to CO2 retention based on arterial blood gas performed after procedure was completed.  Current medications include aspirin 81 mg p.o. once a day Lipitor 40 mg p.o. once a day  "patient is on multiple pressors  Patient is currently on DVT prophylaxis dose of Lovenox  If there is no contraindication consider full dose anticoagulation for today atrial arrhythmia atrial fibrillation  Follow-up on the results of echocardiogram  Continue supportive care          Chief Complaint: Acute myocardial infarction respiratory failure    Subjective patient is currently abstaining from    Interval History: Still on vent sedated    History taken from: patient chart    Review of Systems:  Review of Systems   Unable to perform ROS: intubated       Objective    Vital Signs  Visit Vitals  BP (!) 83/47   Pulse 105   Temp 96.3 °F (35.7 °C) (Axillary)   Resp 24   Ht 162.6 cm (64\")   Wt 57.6 kg (127 lb)   SpO2 90%   BMI 21.80 kg/m²     Oxygen Therapy  SpO2: 90 %  Pulse Oximetry Type: Continuous  Device (Oxygen Therapy): humidified, high-flow nasal cannula  Flow (L/min): 4  Oxygen Concentration (%): 40  Probe Placed On (Pulse Ox): Right:, finger  Flowsheet Rows    Flowsheet Row First Filed Value   Admission Height 162.6 cm (64\") Documented at 03/16/2023 2105   Admission Weight 59.7 kg (131 lb 9.8 oz) Documented at 03/16/2023 2105        Intake & Output (last 3 days)       03/14 0701  03/15 0700 03/15 0701  03/16 0700 03/16 0701  03/17 0700 03/17 0701  03/18 0700    I.V. (mL/kg)    1000 (17.4)    Total Intake(mL/kg)    1000 (17.4)    Urine (mL/kg/hr)   95 285 (0.5)    Total Output   95 285    Net   -95 +715                Lines, Drains & Airways     Active LDAs     Name Placement date Placement time Site Days    CVC Triple Lumen 03/17/23 Non-tunneled Left Femoral 03/17/23  0045  Femoral  less than 1    Peripheral IV 03/17/23 0549 Anterior;Distal;Right;Upper Arm 03/17/23  0549  Arm  less than 1    Urethral Catheter Silicone 03/17/23  0147  -- less than 1                Physical Exam:  Constitutional:       Appearance: Well-developed. Chronically ill-appearing.      Interventions: Sedated and intubated.   Eyes:      " Conjunctiva/sclera: Conjunctivae normal.      Pupils: Pupils are equal, round, and reactive to light.   HENT:      Head: Normocephalic and atraumatic.   Neck:      Thyroid: No thyromegaly.   Pulmonary:      Effort: Pulmonary effort is normal. Intubated.      Breath sounds: Examination of the right-lower field reveals rhonchi and rales. Examination of the left-lower field reveals rhonchi and rales. Rhonchi present. Rales present.   Cardiovascular:      Normal rate. Regular rhythm.   Pulses:     Intact distal pulses.   Edema:     Peripheral edema absent.   Abdominal:      General: Bowel sounds are normal.      Palpations: Abdomen is soft.   Musculoskeletal:      Cervical back: Normal range of motion and neck supple. Skin:     General: Skin is warm.   Neurological:      Mental Status: Oriented to person, place, and time.         Results Review:     I reviewed the patient's new clinical results.    Lab Results (last 24 hours)     Procedure Component Value Units Date/Time    POC Glucose Once [[id number]]  (Normal) Collected: 03/17/23 1708    Specimen: Blood Updated: 03/17/23 1712     Glucose 100 mg/dL      Comment: Serial Number: [id number]:  [id number]       POC Glucose Once [[id number]]  (Normal) Collected: 03/17/23 1634    Specimen: Blood Updated: 03/17/23 1635     Glucose 105 mg/dL      Comment: Serial Number: [id number]:  [id number]       POC Glucose Once [[id number]]  (Abnormal) Collected: 03/17/23 1530    Specimen: Blood Updated: 03/17/23 1531     Glucose 112 mg/dL      Comment: Serial Number: [id number]:  [id number]       Blood Gas, Arterial - [[id number]]  (Abnormal) Collected: 03/17/23 1452    Specimen: Arterial Blood Updated: 03/17/23 1456     Site Right Radial     Branden's Test Positive     pH, Arterial 7.365 pH units      pCO2, Arterial 38.9 mm Hg      pO2, Arterial 113.1 mm Hg      HCO3, Arterial 22.2 mmol/L      Base Excess, Arterial -2.9 mmol/L      Comment: Serial Number: [id number]:   [id number]        O2 Saturation, Arterial 98.3 %      CO2 Content 23.4 mmol/L      Barometric Pressure for Blood Gas --     Comment: N/A        Modality Adult Vent     FIO2 40 %      Ventilator Mode ;AC     PEEP 5     PSV 10 cmH2O      Hemodilution No    POC Glucose Once [[id number]]  (Abnormal) Collected: 03/17/23 1446    Specimen: Blood Updated: 03/17/23 1448     Glucose 112 mg/dL      Comment: Serial Number: [id number]:  [id number]       POC Glucose Once [[id number]]  (Abnormal) Collected: 03/17/23 1410    Specimen: Blood Updated: 03/17/23 1411     Glucose 109 mg/dL      Comment: Serial Number: [id number]:  [id number]       POC Glucose Once [[id number]]  (Abnormal) Collected: 03/17/23 1257    Specimen: Blood Updated: 03/17/23 1258     Glucose 121 mg/dL      Comment: Serial Number: [id number]:  [id number]       Basic Metabolic Panel [[id number]]  (Abnormal) Collected: 03/17/23 1105    Specimen: Blood Updated: 03/17/23 1200     Glucose 134 mg/dL      BUN 14 mg/dL      Creatinine 0.78 mg/dL      Sodium 135 mmol/L      Potassium 4.2 mmol/L      Comment: Slight hemolysis detected by analyzer. Results may be affected.        Chloride 104 mmol/L      CO2 17.0 mmol/L      Calcium 7.7 mg/dL      BUN/Creatinine Ratio 17.9     Anion Gap 14.0 mmol/L      eGFR 76.9 mL/min/1.73     Narrative:      GFR Normal >60  Chronic Kidney Disease <60  Kidney Failure <15    The GFR formula is only valid for adults with stable renal function between ages 18 and 70.    POC Glucose Once [[id number]]  (Abnormal) Collected: 03/17/23 1156    Specimen: Blood Updated: 03/17/23 1157     Glucose 127 mg/dL      Comment: Serial Number: [id number]:  [id number]       POC Glucose Once [[id number]]  (Abnormal) Collected: 03/17/23 1148    Specimen: Blood Updated: 03/17/23 1150     Glucose 128 mg/dL      Comment: Serial Number: [id number]:  [id number]       TSH [[id number]]  (Normal) Collected: 03/16/23 2337    Specimen: Blood Updated:  "03/17/23 1051     TSH 2.790 uIU/mL     POC Glucose Once [[id number]]  (Abnormal) Collected: 03/17/23 1047    Specimen: Blood Updated: 03/17/23 1048     Glucose 122 mg/dL      Comment: Serial Number: [id number]:  [id number]       Procalcitonin [[id number]]  (Normal) Collected: 03/16/23 2337    Specimen: Blood Updated: 03/17/23 1033     Procalcitonin 0.06 ng/mL     Narrative:      As a Marker for Sepsis (Non-Neonates):    1. <0.5 ng/mL represents a low risk of severe sepsis and/or septic shock.  2. >2 ng/mL represents a high risk of severe sepsis and/or septic shock.    As a Marker for Lower Respiratory Tract Infections that require antibiotic therapy:    PCT on Admission    Antibiotic Therapy       6-12 Hrs later    >0.5                Strongly Recommended  >0.25 - <0.5        Recommended   0.1 - 0.25          Discouraged              Remeasure/reassess PCT  <0.1                Strongly Discouraged     Remeasure/reassess PCT    As 28 day mortality risk marker: \"Change in Procalcitonin Result\" (>80% or <=80%) if Day 0 (or Day 1) and Day 4 values are available. Refer to [email or web address]    Change in PCT <=80%  A decrease of PCT levels below or equal to 80% defines a positive change in PCT test result representing a higher risk for 28-day all-cause mortality of patients diagnosed with severe sepsis for septic shock.    Change in PCT >80%  A decrease of PCT levels of more than 80% defines a negative change in PCT result representing a lower risk for 28-day all-cause mortality of patients diagnosed with severe sepsis or septic shock.       Hemoglobin A1c [[id number]]  (Abnormal) Collected: 03/16/23 2337    Specimen: Blood Updated: 03/17/23 1032     Hemoglobin A1C 5.70 %     POC Glucose Once [[id number]]  (Abnormal) Collected: 03/17/23 0946    Specimen: Blood Updated: 03/17/23 0948     Glucose 145 mg/dL      Comment: Serial Number: [id number]:  [id number]       POC Glucose Once [[id number]]  " (Abnormal) Collected: 03/17/23 0940    Specimen: Blood Updated: 03/17/23 0941     Glucose 140 mg/dL      Comment: Serial Number: [id number]:  [id number]       Respiratory Panel PCR w/COVID-19(SARS-CoV-2) RAJI/TIMMY/DELTA/PAD/COR/MAD/LEONOR In-House, NP Swab in UTM/VTM, 3-4 HR TAT - Swab, Nasopharynx [[id number]]  (Normal) Collected: 03/17/23 0817    Specimen: Swab from Nasopharynx Updated: 03/17/23 0933     ADENOVIRUS, PCR Not Detected     Coronavirus 229E Not Detected     Coronavirus HKU1 Not Detected     Coronavirus NL63 Not Detected     Coronavirus OC43 Not Detected     COVID19 Not Detected     Human Metapneumovirus Not Detected     Human Rhinovirus/Enterovirus Not Detected     Influenza A PCR Not Detected     Influenza B PCR Not Detected     Parainfluenza Virus 1 Not Detected     Parainfluenza Virus 2 Not Detected     Parainfluenza Virus 3 Not Detected     Parainfluenza Virus 4 Not Detected     RSV, PCR Not Detected     Bordetella pertussis pcr Not Detected     Bordetella parapertussis PCR Not Detected     Chlamydophila pneumoniae PCR Not Detected     Mycoplasma pneumo by PCR Not Detected    Narrative:      In the setting of a positive respiratory panel with a viral infection PLUS a negative procalcitonin without other underlying concern for bacterial infection, consider observing off antibiotics or discontinuation of antibiotics and continue supportive care. If the respiratory panel is positive for atypical bacterial infection (Bordetella pertussis, Chlamydophila pneumoniae, or Mycoplasma pneumoniae), consider antibiotic de-escalation to target atypical bacterial infection.    POC Glucose Once [[id number]]  (Abnormal) Collected: 03/17/23 0832    Specimen: Blood Updated: 03/17/23 0833     Glucose 169 mg/dL      Comment: Serial Number: [id number]:  [id number]       POC Glucose Once [[id number]]  (Abnormal) Collected: 03/17/23 0722    Specimen: Blood Updated: 03/17/23 0724     Glucose 181 mg/dL      Comment:  Serial Number: [id number]:  [id number]       Respiratory Culture - Sputum, ET Suction [[id number]] Collected: 03/17/23 0521    Specimen: Sputum from ET Suction Updated: 03/17/23 0654     Gram Stain Rare (1+) Epithelial cells per low power field      Many (4+) WBCs per low power field      Moderate (3+) Gram positive cocci in pairs and chains      Moderate (3+) Gram negative bacilli    Urinalysis, Microscopic Only - Indwelling Urethral Catheter [[id number]]  (Abnormal) Collected: 03/17/23 0549    Specimen: Urine from Indwelling Urethral Catheter Updated: 03/17/23 0618     RBC, UA 0-2 /HPF      WBC, UA 0-2 /HPF      Comment: Urine culture not indicated.        Bacteria, UA Trace /HPF      Squamous Epithelial Cells, UA 0-2 /HPF      Hyaline Casts, UA 0-2 /LPF      Methodology Manual Light Microscopy    POC Glucose Once [[id number]]  (Abnormal) Collected: 03/17/23 0617    Specimen: Blood Updated: 03/17/23 0618     Glucose 256 mg/dL      Comment: Serial Number: [id number]:  [id number]       Urinalysis With Culture If Indicated - Indwelling Urethral Catheter [[id number]]  (Abnormal) Collected: 03/17/23 0549    Specimen: Urine from Indwelling Urethral Catheter Updated: 03/17/23 0609     Color, UA Yellow     Appearance, UA Cloudy     pH, UA <=5.0     Specific Gravity, UA 1.052     Glucose,  mg/dL (Trace)     Ketones, UA Negative     Bilirubin, UA Negative     Blood, UA Moderate (2+)     Protein,  mg/dL (2+)     Leuk Esterase, UA Negative     Nitrite, UA Negative     Urobilinogen, UA 1.0 E.U./dL    Narrative:      In absence of clinical symptoms, the presence of pyuria, bacteria, and/or nitrites on the urinalysis result does not correlate with infection.    Blood Culture - Blood, Arm, Left [[id number]] Collected: 03/17/23 0547    Specimen: Blood from Arm, Left Updated: 03/17/23 0555    Blood Culture - Blood, Arm, Right [[id number]] Collected: 03/17/23 0547    Specimen: Blood from Arm, Right Updated:  03/17/23 0554    MRSA Screen, PCR (Inpatient) - Swab, Nares [[id number]]  (Normal) Collected: 03/17/23 0329    Specimen: Swab from Nares Updated: 03/17/23 0549     MRSA PCR No MRSA Detected    Narrative:      The negative predictive value of this diagnostic test is high and should only be used to consider de-escalating anti-MRSA therapy. A positive result may indicate colonization with MRSA and must be correlated clinically.    POC Glucose Once [[id number]]  (Abnormal) Collected: 03/17/23 0510    Specimen: Blood Updated: 03/17/23 0511     Glucose 321 mg/dL      Comment: Serial Number: [id number]:  [id number]       Blood Gas, Arterial - [[id number]]  (Abnormal) Collected: 03/17/23 0503    Specimen: Arterial Blood Updated: 03/17/23 0507     Site Arterial Line     Branden's Test N/A     pH, Arterial 7.315 pH units      pCO2, Arterial 33.6 mm Hg      pO2, Arterial 141.0 mm Hg      HCO3, Arterial 17.1 mmol/L      Base Excess, Arterial -8.1 mmol/L      Comment: Serial Number: [id number]:  [id number]        O2 Saturation, Arterial 99.0 %      CO2 Content 18.1 mmol/L      Barometric Pressure for Blood Gas --     Comment: N/A        Modality Adult Vent     FIO2 40 %      Ventilator Mode ;AC     Set Tidal Volume 500     PEEP 5     Hemodilution No     Respiratory Rate 24    POCT Electrolytes +HGB +HCT [[id number]]  (Abnormal) Collected: 03/16/23 2315    Specimen: Blood Updated: 03/17/23 0237     Sodium 132 mmol/L      POC Potassium 4.4 mmol/L      Ionized Calcium 0.97 mmol/L      Comment: Serial Number: [id number]:  [id number]        Glucose 448 mg/dL      Hematocrit 34 %      Hemoglobin 11.5 g/dL     POC Lactate [[id number]]  (Abnormal) Collected: 03/16/23 2315    Specimen: Blood Updated: 03/17/23 0237     Lactate 3.2 mmol/L      Comment: Serial Number: [id number]:  [id number]       POC Glucose Once [[id number]]  (Abnormal) Collected: 03/16/23 2315    Specimen: Blood Updated: 03/17/23 0234     Glucose 448 mg/dL      Comment:  Serial Number: [id number]:  [id number]       CBC & Differential [[id number]]  (Abnormal) Collected: 03/16/23 2337    Specimen: Blood Updated: 03/17/23 0123    Narrative:      The following orders were created for panel order CBC & Differential.  Procedure                               Abnormality         Status                     ---------                               -----------         ------                     Manual Differential[[id number]]          Abnormal            Final result               CBC Auto Differential[[id number]]        Abnormal            Final result                 Please view results for these tests on the individual orders.    CBC Auto Differential [[id number]]  (Abnormal) Collected: 03/16/23 2337    Specimen: Blood Updated: 03/17/23 0123     WBC 25.80 10*3/mm3      RBC 4.32 10*6/mm3      Hemoglobin 11.7 g/dL      Hematocrit 36.9 %      MCV 85.5 fL      MCH 27.1 pg      MCHC 31.7 g/dL      RDW 13.7 %      RDW-SD 43.3 fl      MPV 7.7 fL      Platelets 317 10*3/mm3     Manual Differential [[id number]]  (Abnormal) Collected: 03/16/23 2337    Specimen: Blood Updated: 03/17/23 0123     Neutrophil % 76.0 %      Lymphocyte % 7.0 %      Eosinophil % 3.0 %      Bands %  9.0 %      Myelocyte % 1.0 %      Atypical Lymphocyte % 4.0 %      Neutrophils Absolute 21.93 10*3/mm3      Lymphocytes Absolute 2.84 10*3/mm3      Eosinophils Absolute 0.77 10*3/mm3      [place] Cells Slight/1+     Poikilocytes Slight/1+     WBC Morphology Normal     Large Platelets Slight/1+    Calcium, Ionized [[id number]]  (Abnormal) Collected: 03/16/23 2337    Specimen: Blood Updated: 03/17/23 0021     Ionized Calcium 1.05 mmol/L     Comprehensive Metabolic Panel [[id number]]  (Abnormal) Collected: 03/16/23 2337    Specimen: Blood Updated: 03/17/23 0015     Glucose 435 mg/dL      BUN 12 mg/dL      Creatinine 0.79 mg/dL      Sodium 134 mmol/L      Potassium 4.9 mmol/L      Chloride 97 mmol/L      CO2 22.0 mmol/L      Calcium 7.5 mg/dL       Total Protein 5.1 g/dL      Albumin 3.0 g/dL      ALT (SGPT) 18 U/L      AST (SGOT) 34 U/L      Alkaline Phosphatase 71 U/L      Total Bilirubin 0.2 mg/dL      Globulin 2.1 gm/dL      A/G Ratio 1.4 g/dL      BUN/Creatinine Ratio 15.2     Anion Gap 15.0 mmol/L      eGFR 75.7 mL/min/1.73     Narrative:      GFR Normal >60  Chronic Kidney Disease <60  Kidney Failure <15    The GFR formula is only valid for adults with stable renal function between ages 18 and 70.    High Sensitivity Troponin T 2Hr [[id number]]  (Abnormal) Collected: 03/16/23 2337    Specimen: Blood Updated: 03/17/23 0014     HS Troponin T 632 ng/L      Troponin T Delta 594 ng/L     Narrative:      High Sensitive Troponin T Reference Range:  <10.0 ng/L- Negative Female for AMI  <15.0 ng/L- Negative Male for AMI  >=10 - Abnormal Female indicating possible myocardial injury.  >=15 - Abnormal Male indicating possible myocardial injury.   Clinicians would have to utilize clinical acumen, EKG, Troponin, and serial changes to determine if it is an Acute Myocardial Infarction or myocardial injury due to an underlying chronic condition.         STAT Lactic Acid, Reflex [[id number]]  (Abnormal) Collected: 03/16/23 2337    Specimen: Blood Updated: 03/17/23 0013     Lactate 3.7 mmol/L     Phosphorus [[id number]]  (Abnormal) Collected: 03/16/23 2337    Specimen: Blood Updated: 03/17/23 0012     Phosphorus 8.0 mg/dL     Lipid Panel [[id number]]  (Abnormal) Collected: 03/16/23 2337    Specimen: Blood Updated: 03/17/23 0012     Total Cholesterol 193 mg/dL      Triglycerides 83 mg/dL      HDL Cholesterol 78 mg/dL      LDL Cholesterol  100 mg/dL      VLDL Cholesterol 15 mg/dL      LDL/HDL Ratio 1.26    Narrative:      Cholesterol Reference Ranges  (U.S. Department of Health and Human Services ATP III Classifications)    Desirable          <200 mg/dL  Borderline High    200-239 mg/dL  High Risk          >240 mg/dL      Triglyceride Reference Ranges  (U.S. Department of  Health and Human Services ATP III Classifications)    Normal           <150 mg/dL  Borderline High  150-199 mg/dL  High             200-499 mg/dL  Very High        >500 mg/dL    HDL Reference Ranges  (U.S. Department of Health and Human Services ATP III Classifications)    Low     <40 mg/dl (major risk factor for CHD)  High    >60 mg/dl ('negative' risk factor for CHD)        LDL Reference Ranges  (U.S. Department of Health and Human Services ATP III Classifications)    Optimal          <100 mg/dL  Near Optimal     100-129 mg/dL  Borderline High  130-159 mg/dL  High             160-189 mg/dL  Very High        >189 mg/dL    Magnesium [[id number]]  (Normal) Collected: 03/16/23 2337    Specimen: Blood Updated: 03/17/23 0012     Magnesium 1.9 mg/dL     Blood Gas, Arterial - [[id number]]  (Abnormal) Collected: 03/16/23 2315    Specimen: Arterial Blood Updated: 03/16/23 2319     Site Arterial Line     Branden's Test Positive     pH, Arterial 7.240 pH units      pCO2, Arterial 59.1 mm Hg      pO2, Arterial 114.2 mm Hg      HCO3, Arterial 25.3 mmol/L      Base Excess, Arterial -2.8 mmol/L      Comment: Serial Number: [id number]:  [id number]        O2 Saturation, Arterial 97.4 %      Barometric Pressure for Blood Gas --     Comment: N/A        Modality Adult Vent     FIO2 40 %      Ventilator Mode ;AC     Set Tidal Volume 500     PEEP 5     Hemodilution No     Respiratory Rate 24    Blood Gas, Arterial - [[id number]]  (Abnormal) Collected: 03/16/23 2207    Specimen: Arterial Blood Updated: 03/16/23 2228     Site Arterial Line     Branden's Test N/A     pH, Arterial 6.838 pH units      pCO2, Arterial 140.2 mm Hg      pO2, Arterial 175.1 mm Hg      HCO3, Arterial 23.8 mmol/L      Base Excess, Arterial -13.1 mmol/L      Comment: Serial Number: [id number]:  [id number]        O2 Saturation, Arterial 97.3 %      CO2 Content 28.1 mmol/L      Barometric Pressure for Blood Gas --     Comment: N/A        Modality NRB     FIO2 100 %       Hemodilution No    POCT Electrolytes +HGB +HCT [[id number]]  (Abnormal) Collected: 03/16/23 2207    Specimen: Blood Updated: 03/16/23 2227     Sodium 123 mmol/L      POC Potassium 4.0 mmol/L      Ionized Calcium 1.15 mmol/L      Comment: Serial Number: [id number]:  [id number]        Glucose 476 mg/dL      Hematocrit 40 %      Hemoglobin 13.5 g/dL     POC Glucose Once [[id number]]  (Abnormal) Collected: 03/16/23 2207    Specimen: Blood Updated: 03/16/23 2226     Glucose 476 mg/dL      Comment: Serial Number: [id number]:  [id number]       POC Lactate [[id number]]  (Abnormal) Collected: 03/16/23 2207    Specimen: Blood Updated: 03/16/23 2225     Lactate 3.4 mmol/L      Comment: Serial Number: [id number]:  [id number]       [place] Draw [[id number]] Collected: 03/16/23 2108    Specimen: Blood Updated: 03/16/23 2215    Narrative:      The following orders were created for panel order [place] Draw.  Procedure                               Abnormality         Status                     ---------                               -----------         ------                     Green Top (Gel)[[id number]]                                  Final result               Lavender Top[[id number]]                                     Final result               Gold Top - SST[[id number]]                                   Final result               Light Blue Top[[id number]]                                   Final result                 Please view results for these tests on the individual orders.    Lavender Top [[id number]] Collected: 03/16/23 2108    Specimen: Blood Updated: 03/16/23 2215     Extra Tube hold for add-on     Comment: Auto resulted       Light Blue Top [[id number]] Collected: 03/16/23 2108    Specimen: Blood Updated: 03/16/23 2215     Extra Tube Hold for add-ons.     Comment: Auto resulted       POC Glucose Once [[id number]]  (Abnormal) Collected: 03/16/23 2154    Specimen: Blood Updated: 03/16/23 2202     Glucose 417 mg/dL      Comment:  Serial Number: [id number]:  [id number]       Green Top (Gel) [[id number]] Collected: 03/16/23 2108    Specimen: Blood Updated: 03/16/23 2139    Comprehensive Metabolic Panel [[id number]]  (Abnormal) Collected: 03/16/23 2108    Specimen: Blood Updated: 03/16/23 2132     Glucose 254 mg/dL      BUN 10 mg/dL      Creatinine 0.62 mg/dL      Sodium 133 mmol/L      Potassium 4.5 mmol/L      Chloride 96 mmol/L      CO2 25.0 mmol/L      Calcium 8.9 mg/dL      Total Protein 6.6 g/dL      Albumin 3.9 g/dL      ALT (SGPT) 10 U/L      AST (SGOT) 17 U/L      Alkaline Phosphatase 82 U/L      Total Bilirubin 0.2 mg/dL      Globulin 2.7 gm/dL      A/G Ratio 1.4 g/dL      BUN/Creatinine Ratio 16.1     Anion Gap 12.0 mmol/L      eGFR 90.2 mL/min/1.73     Narrative:      GFR Normal >60  Chronic Kidney Disease <60  Kidney Failure <15    The GFR formula is only valid for adults with stable renal function between ages 18 and 70.    Magnesium [[id number]]  (Normal) Collected: 03/16/23 2108    Specimen: Blood Updated: 03/16/23 2132     Magnesium 2.0 mg/dL     High Sensitivity Troponin T [[id number]]  (Abnormal) Collected: 03/16/23 2108    Specimen: Blood Updated: 03/16/23 2132     HS Troponin T 38 ng/L     Narrative:      High Sensitive Troponin T Reference Range:  <10.0 ng/L- Negative Female for AMI  <15.0 ng/L- Negative Male for AMI  >=10 - Abnormal Female indicating possible myocardial injury.  >=15 - Abnormal Male indicating possible myocardial injury.   Clinicians would have to utilize clinical acumen, EKG, Troponin, and serial changes to determine if it is an Acute Myocardial Infarction or myocardial injury due to an underlying chronic condition.         Protime-INR [[id number]]  (Normal) Collected: 03/16/23 2108    Specimen: Blood Updated: 03/16/23 2124     Protime 9.9 Seconds      INR 0.96    Gold Top - SST [[id number]] Collected: 03/16/23 2108    Specimen: Blood Updated: 03/16/23 2118    CBC & Differential [[id number]]  (Abnormal)  Collected: 03/16/23 2108    Specimen: Blood Updated: 03/16/23 2113    Narrative:      The following orders were created for panel order CBC & Differential.  Procedure                               Abnormality         Status                     ---------                               -----------         ------                     CBC Auto Differential[[id number]]        Abnormal            Final result                 Please view results for these tests on the individual orders.    CBC Auto Differential [[id number]]  (Abnormal) Collected: 03/16/23 2108    Specimen: Blood Updated: 03/16/23 2113     WBC 15.70 10*3/mm3      RBC 4.68 10*6/mm3      Hemoglobin 12.9 g/dL      Hematocrit 39.9 %      MCV 85.3 fL      MCH 27.6 pg      MCHC 32.3 g/dL      RDW 13.6 %      RDW-SD 42.4 fl      MPV 7.7 fL      Platelets 310 10*3/mm3      Neutrophil % 56.5 %      Lymphocyte % 34.0 %      Monocyte % 7.0 %      Eosinophil % 1.9 %      Basophil % 0.6 %      Neutrophils, Absolute 8.90 10*3/mm3      Lymphocytes, Absolute 5.40 10*3/mm3      Monocytes, Absolute 1.10 10*3/mm3      Eosinophils, Absolute 0.30 10*3/mm3      Basophils, Absolute 0.10 10*3/mm3      nRBC 0.2 /100 WBC               Medication Review:   I have reviewed the patient's current medication list  Scheduled Meds:aspirin, 81 mg, Oral, Daily  atorvastatin, 40 mg, Oral, Nightly  cefepime, 2 g, Intravenous, Q12H  chlorhexidine, 15 mL, Mouth/Throat, Q12H  enoxaparin, 40 mg, Subcutaneous, Q24H  ipratropium-albuterol, 3 mL, Nebulization, 4x Daily - RT  methylPREDNISolone sodium succinate, 40 mg, Intravenous, Q6H  pantoprazole, 40 mg, Intravenous, Q24H  senna-docusate sodium, 2 tablet, Nasogastric, BID  sodium chloride, 10 mL, Intravenous, Q12H  sodium chloride, 10 mL, Intravenous, Q12H      Continuous Infusions:dexmedetomidine, 0.2-1.5 mcg/kg/hr, Last Rate: 1.5 mcg/kg/hr (03/17/23 1225)  fentanyl 10 mcg/mL,  mcg/hr, Last Rate: 25 mcg/hr (03/17/23 1001)  insulin, 0-100  Units/hr, Last Rate: 2.5 Units/hr (03/17/23 0948)  Pharmacy to Dose Cefepime,   phenylephrine, 0.5-3 mcg/kg/min, Last Rate: 1.6 mcg/kg/min (03/17/23 0745)  propofol, 5-50 mcg/kg/min, Last Rate: 20 mcg/kg/min (03/17/23 1003)      PRN Meds:.•  acetaminophen **OR** acetaminophen  •  atropine  •  dexmedetomidine  •  dextrose  •  dextrose  •  fentaNYL  •  fentanyl 10 mcg/mL  •  fentaNYL citrate (PF)  •  glucagon (human recombinant)  •  ondansetron **OR** ondansetron  •  Pharmacy to Dose Cefepime  •  polyethylene glycol  •  propofol  •  sodium chloride  •  sodium chloride  •  sodium chloride  •  sodium chloride  •  sodium chloride    ECG/EMG Results (last 24 hours)     Procedure Component Value Units Date/Time    ECG 12 Lead Chest Pain [[id number]] Collected: 03/16/23 2101     Updated: 03/16/23 2102     QT Interval 315 ms     Narrative:      HEART RATE= 151  bpm  RR Interval= 398  ms  LA Interval=   ms  P Horizontal Axis=   deg  P Front Axis=   deg  QRSD Interval= 89  ms  QT Interval= 315  ms  QRS Axis= -50  deg  T Wave Axis= 85  deg  - ABNORMAL ECG -  Atrial fibrillation with rapid V-rate  Ventricular premature complex  Left anterior fascicular block  Extensive anterior infarct, acute (LAD)  Electronically Signed By:   Date and Time of Study: 2023-03-16 21:01:18    ECG 12 Lead Chest Pain [[id number]] Collected: 03/16/23 2221     Updated: 03/16/23 2222     QT Interval 299 ms     Narrative:      HEART RATE= 136  bpm  RR Interval= 440  ms  LA Interval= 147  ms  P Horizontal Axis= 248  deg  P Front Axis= 88  deg  QRSD Interval= 86  ms  QT Interval= 299  ms  QRS Axis= -59  deg  T Wave Axis= 93  deg  - ABNORMAL ECG -  Sinus tachycardia  Left anterior fascicular block  Anterior infarct, old  Lateral wall also involved  When compared with ECG of 16-Mar-2023 21:01:18,  Significant change in rhythm: previously atrial fibrillation  New or worsened ischemia or infarction  Electronically Signed By:   Date and Time of Study: 2023-03-16  22:21:45    ECG 12 Lead Rhythm Change [[id number]] Collected: 03/17/23 0049     Updated: 03/17/23 0050     QT Interval 397 ms     Narrative:      HEART RATE= 87  bpm  RR Interval= 685  ms  W[place] Interval= 178  ms  P Horizontal Axis= -20  deg  P Front Axis= 84  deg  QRSD Interval= 77  ms  QT Interval= 397  ms  QRS Axis= -61  deg  T Wave Axis= 59  deg  - ABNORMAL ECG -  Sinus rhythm  Probable left atrial enlargement  LAD, consider left anterior fascicular block  Anterior infarct, acute (LAD)  Electronically Signed By:   Date and Time of Study: 2023-03-17 00:49:54    Adult Transthoracic Echo Complete W/ Cont if Necessary Per Protocol [[id number]] Resulted: 03/17/23 1000     Updated: 03/17/23 1005     Target HR (85%) 119 bpm      Max. Pred. HR (100%) 140 bpm      LVIDd 4.5 cm      LVIDs 3.3 cm      IVSd 0.80 cm      LVPWd 0.80 cm      FS 26.7 %      IVS/LVPW 1.00 cm      ESV(cubed) 35.9 ml      EDV(cubed) 91.1 ml      LV Kwong Vol (BSA corrected) 59.7 cm2      LVOT area 3.1 cm2      LV mass(C)d 113.6 grams      LVOT diam 2.00 cm      EDV(MOD-sp4) 96.3 ml      MV E max dina 36.2 cm/sec      MV A max dina 53.5 cm/sec      MV dec time 0.25 msec      MV E/A 0.68     LA ESV Index (BP) 24.3 ml/m2      RVIDd 2.8 cm      LA dimension (2D)  3.1 cm      MV max PG 0.93 mmHg      MV mean PG 1.00 mmHg      MV V2 VTI 11.6 cm      MV P1/2t 61.5 msec      MVA(P1/2t) 3.6 cm2      MV dec slope 248.0 cm/sec2      TR max dina 319.0 cm/sec      TR max PG 40.7 mmHg      RVSP(TR) 43.7 mmHg      RAP systole 3.0 mmHg      RV V1 max PG 0.95 mmHg      RV V1 max 48.7 cm/sec      RV V1 VTI 6.6 cm      PA V2 max 73.0 cm/sec      Sinus 4.0 cm      ESV(MOD-sp4) 66.9 ml      LV Sys Vol (BSA corrected) 41.5 cm2      SV(MOD-sp4) 29.4 ml      SI(MOD-sp4) 18.2 ml/m2      EF(MOD-sp4) 30.6 %     Narrative:      •  Estimated right ventricular systolic pressure from tricuspid   regurgitation is mildly elevated (35-45 mmHg).      Impression:                Imaging  Results (Last 24 Hours)     Procedure Component Value Units Date/Time    XR Chest 1 View [[id number]] Collected: 03/17/23 0759     Updated: 03/17/23 0804    Narrative:      XR CHEST 1 VW    Date of Exam: 3/17/2023 6:06 AM EDT    Indication: Intubated Patient.    Comparison: 3/16/2023    Findings:  Interval placement of an endotracheal tube 3 cm above the maninder. Heart size borderline. Pulmonary vasculature is not especially prominent. Lungs appear hyperinflated. Stable mild increased interstitial markings more in the lower lungs. No airspace   disease. Minimal blunting of the costophrenic angles. No pneumothorax      Impression:      Impression:    1. Pulmonary hyperinflation suggesting COPD  2. Stable mild interstitial prominence with possible small pleural effusion suggesting mild edema    Electronically Signed: Galen Day    3/17/2023 8:02 AM EDT    Workstation ID: [id number]    XR Chest 1 View [[id number]] Collected: 03/16/23 2123     Updated: 03/16/23 2125    Narrative:      XR CHEST 1 VW    Date of Exam: 3/16/2023 9:13 PM EDT    Indication: Acute STEMI Protocol.    Comparison: None available.    Findings:  There is interstitial prominence. There is no pneumothorax, pleural effusion or focal airspace consolidation. Heart size and pulmonary vasculature appear within normal limits. Regional bones appear intact.      Impression:      Impression:  Interstitial disease in the lungs but could reflect chronic disease or mild interstitial edema.    Electronically Signed: Aldo Sanchez    3/16/2023 9:23 PM EDT    Workstation ID: [id number]          Assessment & Plan       ST elevation myocardial infarction involving left anterior descending (LAD) coronary artery (HCC)        Jazmín Vega MD  03/17/23  17:30 EDT

## 2023-03-18 NOTE — PROGRESS NOTES
Cardiology [facility]      Patient Care Team:  Edie Donahue NP as PCP - General (Nurse Practitioner)      78-year-old female brought in by EMS for shortness of breath.  She has a history of COPD.  ECG transmitted by EMS demonstrated a lateral ST segment elevation myocardial infarction.  Cath Lab was activated in advance of patient arrival.     In the emergency department the patient was noted to be in rapid atrial fibrillation.  The patient received diltiazem and nitroglycerin.      Patient is tachypneic and is only able to verbalize that she is short of breath.  She denies any pain.  Repeat EKG demonstrated improvement in ST segments although still some subtle lateral changes were noted.  Decision was made to proceed with cardiac catheterization.         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  No flow-limiting lesion is identified that would explain the lateral ST segment elevation noted on the EMS ECG.    The ECG performed in the ER appeared much better but still demonstrated some subtle ST segment elevation in the lateral leads.    EKG performed in the Cath Lab demonstrates complete resolution of the ST segment elevation.  It certainly possible that there was a thrombus in one of the vessels may be the mid LAD that resolved.  Nonetheless, no indication at the present time for PCI given altered mentation.  Altered mentation appears to be related to CO2 retention based on arterial blood gas performed after procedure was completed.  Current medications include aspirin 81 mg p.o. once a day Lipitor 40 mg p.o. once a day  patient is on multiple pressors  Patient is currently on DVT prophylaxis dose of Lovenox  If there is no contraindication consider full dose anticoagulation for today atrial arrhythmia atrial fibrillation  Follow-up on the results of echocardiogram  Continue supportive care      Stress cardiomyopathy  Severe LV dysfunction  Tachycardia  Likely stress cardiomyopathy  Continue supportive care  Patient CODE STATUS is DNR  Discussed with nursing staff  Continue supportive care  Tmax is 96.7 pulse is 126 respirations are 24 blood pressure is 95/46 sats are 94%  Patient is currently on diuretics phenylephrine and also intermittent dose of digoxin  Sodium is 137 potassium is 4.8 creatinine 0.9 magnesium is 2.0 white cell count is 25,000 hemoglobin is 11  Current medications include aspirin 81 mg p.o. once a day Lipitor 40 mg p.o. once a day Lasix 40 mg IV twice daily patient is currently on phenylephrine and also some Levophed and intermittent dose of digoxin for rate control and she is also on anticoagulation therapy with Eliquis 5 mg p.o. twice daily  Overall prognosis is poor  Further recommendations based on patient course              Chief Complaint: Acute myocardial infarction respiratory failure    Subjective patient is currently abstaining from    Interval History: Still on vent sedated    History taken from: patient chart    Review of Systems:  Review of Systems   Constitutional: Negative for chills, decreased appetite and malaise/fatigue.   HENT: Negative for congestion and hoarse voice.    Eyes: Negative for blurred vision and double vision.   Cardiovascular: Positive for dyspnea on exertion, irregular heartbeat, leg swelling and near-syncope.   Respiratory: Negative for cough and shortness of breath.    Hematologic/Lymphatic: Negative for adenopathy. Does not bruise/bleed easily.   Skin: Negative for rash.   Musculoskeletal: Positive for back pain and myalgias.   Gastrointestinal: Negative for bloating,  "abdominal pain, hematemesis and hematochezia.   Genitourinary: Negative for flank pain.   Neurological: Negative for dizziness and focal weakness.   Psychiatric/Behavioral: Positive for altered mental status and memory loss.       Objective    Vital Signs  Visit Vitals  BP 95/47 (BP Location: Right arm, Patient Position: Lying)   Pulse (!) 126   Temp 96.7 °F (35.9 °C) (Axillary)   Resp 24   Ht 162.6 cm (64.02\")   Wt 58.4 kg (128 lb 12 oz)   SpO2 94%   BMI 22.09 kg/m²     Oxygen Therapy  SpO2: 94 %  Pulse Oximetry Type: Continuous  Device (Oxygen Therapy): heated, high-flow nasal cannula, other (see comments) (airvo)  $ High Flow Nasal Cannula Set-Up: yes  Flow (L/min): 50  Oxygen Concentration (%): 44  Probe Placed On (Pulse Ox): finger, Right:  Flowsheet Rows    Flowsheet Row First Filed Value   Admission Height 162.6 cm (64\") Documented at 03/16/2023 2105   Admission Weight 59.7 kg (131 lb 9.8 oz) Documented at 03/16/2023 2105        Intake & Output (last 3 days)       03/15 0701  03/16 0700 03/16 0701  03/17 0700 03/17 0701  03/18 0700 03/18 0701  03/19 0700    P.O.   2410 240    I.V. (mL/kg)   4377.3 (75) 338.1 (5.8)    Total Intake(mL/kg)   6787.3 (116.2) 578.1 (9.9)    Urine (mL/kg/hr)  95 730 (0.5) 250 (0.5)    Total Output  95 730 250    Net  -95 +6057.3 +328.1                Lines, Drains & Airways     Active LDAs     Name Placement date Placement time Site Days    CVC Triple Lumen 03/17/23 Non-tunneled Left Femoral 03/17/23  0045  Femoral  less than 1    Peripheral IV 03/17/23 0549 Anterior;Distal;Right;Upper Arm 03/17/23  0549  Arm  less than 1    Urethral Catheter Silicone 03/17/23  0147  -- less than 1                Physical Exam:  Constitutional:       Appearance: Well-developed. Chronically ill-appearing.      Interventions: Sedated and intubated.   Eyes:      Conjunctiva/sclera: Conjunctivae normal.      Pupils: Pupils are equal, round, and reactive to light.   HENT:      Head: Normocephalic and " atraumatic.   Neck:      Thyroid: No thyromegaly.   Pulmonary:      Effort: Pulmonary effort is normal. Intubated.      Breath sounds: Examination of the right-lower field reveals rhonchi and rales. Examination of the left-lower field reveals rhonchi and rales. Rhonchi present. Rales present.   Cardiovascular:      Normal rate. Regular rhythm.   Pulses:     Intact distal pulses.   Edema:     Peripheral edema absent.   Abdominal:      General: Bowel sounds are normal.      Palpations: Abdomen is soft.   Musculoskeletal:      Cervical back: Normal range of motion and neck supple. Skin:     General: Skin is warm.   Neurological:      Mental Status: Oriented to person, place, and time.         Results Review:     I reviewed the patient's new clinical results.    Lab Results (last 24 hours)     Procedure Component Value Units Date/Time    Legionella Antigen, Urine - Urine, Urine, Clean Catch [[id number]]  (Normal) Collected: 03/18/23 1409    Specimen: Urine, Clean Catch Updated: 03/18/23 1438     LEGIONELLA ANTIGEN, URINE Negative    S. Pneumo Ag Urine or CSF - Urine, Urine, Clean Catch [[id number]]  (Normal) Collected: 03/18/23 1409    Specimen: Urine, Clean Catch Updated: 03/18/23 1438     Strep Pneumo Ag Negative    POC Glucose Once [[id number]]  (Abnormal) Collected: 03/18/23 1421    Specimen: Blood Updated: 03/18/23 1422     Glucose 156 mg/dL      Comment: Serial Number: [id number]:  [id number]       Respiratory Culture - Sputum, ET Suction [[id number]]  (Abnormal) Collected: 03/17/23 0521    Specimen: Sputum from ET Suction Updated: 03/18/23 1353     Respiratory Culture Heavy growth (4+) Haemophilus influenzae     Comment: This isolate is beta-lactamase NEGATIVE and are routinely susceptible to ampicillin and other beta-lactams.          Gram Stain Rare (1+) Epithelial cells per low power field      Many (4+) WBCs per low power field      Moderate (3+) Gram positive cocci in pairs and chains      Moderate (3+)  Gram negative bacilli    Narrative:      Organism under investigation. 3/18/23      Blood Gas, Venous - [[id number]]  (Abnormal) Collected: 03/18/23 1007    Specimen: Venous Blood Updated: 03/18/23 1019     Site Central[facility]     pH, Venous 7.201 pH Units      pCO2, Venous 64.3 mm Hg      pO2, Venous 32.0 mm Hg      HCO3, Venous 25.2 mmol/L      Base Excess, Venous -3.8 mmol/L      Comment: Serial Number: [id number]:  [id number]        O2 Saturation, Venous 47.0 %      CO2 Content 27.2 mmol/L      Barometric Pressure for Blood Gas --     Comment: N/A        Modality HFNC     FIO2 44 %     Blood Gas, Arterial - [[id number]]  (Abnormal) Collected: 03/18/23 0845    Specimen: Arterial Blood Updated: 03/18/23 0849     Site Left Brachial     Branden's Test Positive     pH, Arterial 7.208 pH units      pCO2, Arterial 59.7 mm Hg      pO2, Arterial 94.2 mm Hg      HCO3, Arterial 23.8 mmol/L      Base Excess, Arterial -4.8 mmol/L      Comment: Serial Number: [id number]:  [id number]        O2 Saturation, Arterial 95.1 %      CO2 Content 25.6 mmol/L      Barometric Pressure for Blood Gas --     Comment: N/A        Modality HFNC     FIO2 44 %      Hemodilution No    POC Glucose Once [[id number]]  (Abnormal) Collected: 03/18/23 0718    Specimen: Blood Updated: 03/18/23 0719     Glucose 166 mg/dL      Comment: Serial Number: [id number]:  [id number]       Blood Culture - Blood, Arm, Left [[id number]]  (Normal) Collected: 03/17/23 0547    Specimen: Blood from Arm, Left Updated: 03/18/23 0600     Blood Culture No growth at 24 hours    Blood Culture - Blood, Arm, Right [[id number]]  (Normal) Collected: 03/17/23 0547    Specimen: Blood from Arm, Right Updated: 03/18/23 0600     Blood Culture No growth at 24 hours    Renal Function Panel [[id number]]  (Abnormal) Collected: 03/18/23 0418    Specimen: Blood Updated: 03/18/23 0446     Glucose 159 mg/dL      BUN 25 mg/dL      Creatinine 0.95 mg/dL      Sodium 137 mmol/L      Potassium 4.8 mmol/L       Comment: Slight hemolysis detected by analyzer. Results may be affected.        Chloride 102 mmol/L      CO2 23.0 mmol/L      Calcium 8.2 mg/dL      Albumin 3.6 g/dL      Phosphorus 4.6 mg/dL      Anion Gap 12.0 mmol/L      BUN/Creatinine Ratio 26.3     eGFR 60.7 mL/min/1.73     Narrative:      GFR Normal >60  Chronic Kidney Disease <60  Kidney Failure <15    The GFR formula is only valid for adults with stable renal function between ages 18 and 70.    Magnesium [[id number]]  (Normal) Collected: 03/18/23 0418    Specimen: Blood Updated: 03/18/23 0440     Magnesium 2.0 mg/dL     CBC & Differential [[id number]]  (Abnormal) Collected: 03/18/23 0418    Specimen: Blood Updated: 03/18/23 0423    Narrative:      The following orders were created for panel order CBC & Differential.  Procedure                               Abnormality         Status                     ---------                               -----------         ------                     CBC Auto Differential[[id number]]        Abnormal            Final result                 Please view results for these tests on the individual orders.    CBC Auto Differential [[id number]]  (Abnormal) Collected: 03/18/23 0418    Specimen: Blood Updated: 03/18/23 0423     WBC 25.90 10*3/mm3      RBC 4.22 10*6/mm3      Hemoglobin 11.3 g/dL      Hematocrit 36.6 %      MCV 86.9 fL      MCH 26.9 pg      MCHC 30.9 g/dL      RDW 14.4 %      RDW-SD 43.8 fl      MPV 7.8 fL      Platelets 181 10*3/mm3      Neutrophil % 90.9 %      Lymphocyte % 4.4 %      Monocyte % 4.3 %      Eosinophil % 0.1 %      Basophil % 0.3 %      Neutrophils, Absolute 23.60 10*3/mm3      Lymphocytes, Absolute 1.10 10*3/mm3      Monocytes, Absolute 1.10 10*3/mm3      Eosinophils, Absolute 0.00 10*3/mm3      Basophils, Absolute 0.10 10*3/mm3      nRBC 0.1 /100 WBC     POC Glucose Once [[id number]]  (Abnormal) Collected: 03/17/23 1919    Specimen: Blood Updated: 03/17/23 1920     Glucose 110 mg/dL      Comment:  Serial Number: [id number]:  [id number]       POC Activated Clotting Time [[id number]]  (Normal) Collected: 03/17/23 1107    Specimen: Arterial Blood Updated: 03/17/23 1823     Activated Clotting Time  137 Seconds      Comment: Serial Number: [id number]:  [id number]       POC Glucose Once [[id number]]  (Normal) Collected: 03/17/23 1708    Specimen: Blood Updated: 03/17/23 1712     Glucose 100 mg/dL      Comment: Serial Number: [id number]:  [id number]       POC Glucose Once [[id number]]  (Normal) Collected: 03/17/23 1634    Specimen: Blood Updated: 03/17/23 1635     Glucose 105 mg/dL      Comment: Serial Number: [id number]:  [id number]       POC Glucose Once [[id number]]  (Abnormal) Collected: 03/17/23 1530    Specimen: Blood Updated: 03/17/23 1531     Glucose 112 mg/dL      Comment: Serial Number: [id number]:  [id number]       Blood Gas, Arterial - [[id number]]  (Abnormal) Collected: 03/17/23 1452    Specimen: Arterial Blood Updated: 03/17/23 1456     Site Right Radial     Branden's Test Positive     pH, Arterial 7.365 pH units      pCO2, Arterial 38.9 mm Hg      pO2, Arterial 113.1 mm Hg      HCO3, Arterial 22.2 mmol/L      Base Excess, Arterial -2.9 mmol/L      Comment: Serial Number: [id number]:  [id number]        O2 Saturation, Arterial 98.3 %      CO2 Content 23.4 mmol/L      Barometric Pressure for Blood Gas --     Comment: N/A        Modality Adult Vent     FIO2 40 %      Ventilator Mode ;AC     PEEP 5     PSV 10 cmH2O      Hemodilution No    POC Glucose Once [[id number]]  (Abnormal) Collected: 03/17/23 1446    Specimen: Blood Updated: 03/17/23 1448     Glucose 112 mg/dL      Comment: Serial Number: [id number]:  [id number]           Results for orders placed during the hospital encounter of 03/16/23    Adult Transthoracic Echo Complete W/ Cont if Necessary Per Protocol    Interpretation Summary  •  Left ventricular systolic function is severely decreased. Left ventricular ejection fraction  appears to be less than 20%.  •  Left ventricular wall thickness is consistent with mild concentric hypertrophy.  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  •  Left ventricular diastolic dysfunction is noted.  •  Moderately reduced right ventricular systolic function noted.  •  The right ventricular cavity is mild to moderately dilated.  •  Moderate to severe tricuspid valve regurgitation is present.  •  Estimated right ventricular systolic pressure from tricuspid regurgitation is mildly elevated (35-45 mmHg).  •  Moderate pulmonary hypertension is present.  •  Mild dilation of the proximal aorta is present.        Medication Review:   I have reviewed the patient's current medication list  Scheduled Meds:apixaban, 5 mg, Oral, Q12H  aspirin, 81 mg, Oral, Daily  atorvastatin, 40 mg, Oral, Nightly  furosemide, 40 mg, Intravenous, Q12H  insulin lispro, 2-9 Units, Subcutaneous, 4x Daily AC & at Bedtime  ipratropium-albuterol, 3 mL, Nebulization, Q6H - RT  methylPREDNISolone sodium succinate, 40 mg, Intravenous, Q12H  sodium chloride, 10 mL, Intravenous, Q12H  sodium chloride, 10 mL, Intravenous, Q12H      Continuous Infusions:dexmedetomidine, 0.2-1.5 mcg/kg/hr, Last Rate: 1.5 mcg/kg/hr (03/17/23 1225)  norepinephrine, 0.02-0.3 mcg/kg/min, Last Rate: 0.3 mcg/kg/min (03/18/23 1345)  phenylephrine, 0.5-3 mcg/kg/min, Last Rate: 2.1 mcg/kg/min (03/18/23 0916)      PRN Meds:.•  acetaminophen **OR** acetaminophen  •  atropine  •  dexmedetomidine  •  dextrose  •  dextrose  •  glucagon (human recombinant)  •  LORazepam  •  ondansetron **OR** ondansetron  •  polyethylene glycol  •  sodium chloride  •  sodium chloride  •  sodium chloride  •  sodium chloride  •  sodium chloride    ECG/EMG Results (last 24 hours)     Procedure Component Value Units Date/Time    ECG 12 Lead Chest Pain [[id number]] Collected: 03/16/23 2101     Updated: 03/16/23 2102     QT Interval 315 ms     Narrative:      HEART  RATE= 151  bpm  RR Interval= 398  ms  OK Interval=   ms  P Horizontal Axis=   deg  P Front Axis=   deg  QRSD Interval= 89  ms  QT Interval= 315  ms  QRS Axis= -50  deg  T Wave Axis= 85  deg  - ABNORMAL ECG -  Atrial fibrillation with rapid V-rate  Ventricular premature complex  Left anterior fascicular block  Extensive anterior infarct, acute (LAD)  Electronically Signed By:   Date and Time of Study: 2023-03-16 21:01:18    ECG 12 Lead Chest Pain [[id number]] Collected: 03/16/23 2221     Updated: 03/16/23 2222     QT Interval 299 ms     Narrative:      HEART RATE= 136  bpm  RR Interval= 440  ms  OK Interval= 147  ms  P Horizontal Axis= 248  deg  P Front Axis= 88  deg  QRSD Interval= 86  ms  QT Interval= 299  ms  QRS Axis= -59  deg  T Wave Axis= 93  deg  - ABNORMAL ECG -  Sinus tachycardia  Left anterior fascicular block  Anterior infarct, old  Lateral wall also involved  When compared with ECG of 16-Mar-2023 21:01:18,  Significant change in rhythm: previously atrial fibrillation  New or worsened ischemia or infarction  Electronically Signed By:   Date and Time of Study: 2023-03-16 22:21:45    ECG 12 Lead Rhythm Change [[id number]] Collected: 03/17/23 0049     Updated: 03/17/23 0050     QT Interval 397 ms     Narrative:      HEART RATE= 87  bpm  RR Interval= 685  ms  OK Interval= 178  ms  P Horizontal Axis= -20  deg  P Front Axis= 84  deg  QRSD Interval= 77  ms  QT Interval= 397  ms  QRS Axis= -61  deg  T Wave Axis= 59  deg  - ABNORMAL ECG -  Sinus rhythm  Probable left atrial enlargement  LAD, consider left anterior fascicular block  Anterior infarct, acute (LAD)  Electronically Signed By:   Date and Time of Study: 2023-03-17 00:49:54    Adult Transthoracic Echo Complete W/ Cont if Necessary Per Protocol [[id number]] Resulted: 03/17/23 1000     Updated: 03/17/23 1005     Target HR (85%) 119 bpm      Max. Pred. HR (100%) 140 bpm      LVIDd 4.5 cm      LVIDs 3.3 cm      IVSd 0.80 cm      LVPWd 0.80 cm      FS 26.7 %       IVS/LVPW 1.00 cm      ESV(cubed) 35.9 ml      EDV(cubed) 91.1 ml      LV Kwong Vol (BSA corrected) 59.7 cm2      LVOT area 3.1 cm2      LV mass(C)d 113.6 grams      LVOT diam 2.00 cm      EDV(MOD-sp4) 96.3 ml      MV E max dina 36.2 cm/sec      MV A max dina 53.5 cm/sec      MV dec time 0.25 msec      MV E/A 0.68     LA ESV Index (BP) 24.3 ml/m2      RVIDd 2.8 cm      LA dimension (2D)  3.1 cm      MV max PG 0.93 mmHg      MV mean PG 1.00 mmHg      MV V2 VTI 11.6 cm      MV P1/2t 61.5 msec      MVA(P1/2t) 3.6 cm2      MV dec slope 248.0 cm/sec2      TR max dina 319.0 cm/sec      TR max PG 40.7 mmHg      RVSP(TR) 43.7 mmHg      RAP systole 3.0 mmHg      RV V1 max PG 0.95 mmHg      RV V1 max 48.7 cm/sec      RV V1 VTI 6.6 cm      PA V2 max 73.0 cm/sec      Sinus 4.0 cm      ESV(MOD-sp4) 66.9 ml      LV Sys Vol (BSA corrected) 41.5 cm2      SV(MOD-sp4) 29.4 ml      SI(MOD-sp4) 18.2 ml/m2      EF(MOD-sp4) 30.6 %     Narrative:      •  Estimated right ventricular systolic pressure from tricuspid   regurgitation is mildly elevated (35-45 mmHg).      Impression:                Imaging Results (Last 24 Hours)     Procedure Component Value Units Date/Time    XR Chest 1 View [[id number]] Collected: 03/18/23 1023     Updated: 03/18/23 1026    Narrative:      XR CHEST 1 VW    Date of Exam: 3/18/2023 10:15 AM EDT    Indication: Hypoxia.    Comparison: 3/17/2023    Findings:  Patient has been extubated. Heart size is normal. No pneumothorax. There is persistent left basilar/lingular airspace disease which may relate to atelectasis or pneumonia. No significant pleural effusion. Calcified right midlung granuloma.      Impression:      Impression:  1. Interval extubation.  2. Left basilar/lingular airspace disease may relate to atelectasis or pneumonia.    Electronically Signed: Edvin Corbett    3/18/2023 10:24 AM EDT    Workstation ID: [id number]          Assessment & Plan       ST elevation myocardial infarction involving left anterior  descending (LAD) coronary artery (HCC)    Moderate malnutrition (HCC)        Jazmín Vega MD  03/18/23  14:48 EDT

## 2023-03-18 NOTE — PLAN OF CARE
Goal Outcome Evaluation:  Plan of Care Reviewed With: patient, daughter    Pt noted to have decrease in oxygenation and SBP

## 2023-03-18 NOTE — PROGRESS NOTES
Critical Care Progress Note     Jamee Malone : 1945 MRN:[id number] LOS:2  Rm: 2301/1     Principal Problem: ST elevation myocardial infarction involving left anterior descending (LAD) coronary artery (HCC)     Reason for follow up: All the medical problems listed below    Summary     A 78 y.o. old female patient with PMH of COPD presents to the hospital with complaints of shortness of breath and was admitted with STEMI.  EKG showed ST elevation and cardiology was consulted.  Had an emergent cardiac cath which showed RCA disease with moderate coronary occlusion involving mid LAD, no interventions needed.     Also found to be in A-fib with RVR on admission, treated with Cardizem.  Subsequently, ABG showed pH of 6.8 with a PCO2 of 140, intubated for hypercapnic respiratory failure.  RVP was negative.  Started on empiric antibiotics for suspected pneumonia, also treated COPD exacerbation with steroids and bronchodilators.  Extubated on 3/17.    Echocardiogram showed LVEF of 20% with LV diastolic dysfunction, moderately reduced RV systolic function.  Went into decompensated heart failure and had progressive worsening of shortness of breath, needed BiPAP.  Patient made herself DNR/DNI.    Significant Events     23 :  Tolerated extubation yesterday, currently on BiPAP due to hypercapnia. Very sedate this morning, received Ativan earlier due to agitation. Sinus tach with frequent PVCs, received Digoxin, Eliquis started. Currently with Edin for pressor support, will change to Levo. Add scheduled diuresis     Assessment / Plan     Acute on chronic respiratory failure with hypoxia and hypercapnia / Acute respiratory acidosis  · Requires home O2 supplementation  • Most likely due to heart failure exacerbation.  Some component of COPD exacerbation cannot be ruled out.  • Extubated 3/17/23   • ABG with respiratory acidosis, now on Bipap.  Patient made herself DNR/DNI.  • Add scheduled diuresis  • Decrease Solu-Medrol  to 40 every 8 hours, continue with DuoNebs.     Cardiogenic shock  • Echo with LVEF of 20%, consistent with Takotsubo cardiomyopathy.  • Normal procalcitonin, septic shock ruled out.  DC all antibiotics.  Sputum cultures with likely colonizers.  Elevated WBC count is likely from steroids.  No febrile episodes.  • Continue with vasopressors for MAP target of 65.     Acute on chronic Combined Systolic and Diastolic heart failure / Pulmonary hypertension:   • Currently decompensated.   · Echocardiogram consistent with Takotsubo cardiomyopathy, LVEF <20%, moderately reduced RV systolic function, pulmonary hypertension with RVSP of 43.  • Eventually will add beta-blockers and Entresto when blood pressure permits. Will add jardiance on discharge due to benefit in CHF.  • Monitor Input/Output very closely. Follow daily weights.   • Net IO Since Admission: 5,962.33 mL [03/18/23 1341]    STEMI  • Troponins peaked at 632, cardiology following.  • Cardiac cath showed RCA disease with moderate coronary occlusion involving mid LAD, no interventions needed.  • Echocardiogram consistent with Takotsubo cardiomyopathy, LVEF <20%, moderately reduced RV systolic function, pulmonary hypertension with RVSP of 43.  • Initiated aspirin and statins.    • Eventually will add beta-blockers when blood pressure permits.     Chronic atrial fibrillation :   • Currently in sinus rhythm with frequent PACs  • Digoxin x1 today   • Cardiology following.  TSH 2.79  • CHADS-VASc score of at least 5.  Started on Eliquis for anticoagulation.     Diabetes mellitus Type 2, not insulin-dependent : well controlled.   • Takes metformin at home, hold while in the ICU.   • Accu checks ACHS and c/w humalog coverage as needed.   • A1c 5.7     Essential Hypertension: well controlled.   • Home HCTZ on hold due to hypotension  • Restart medications as needed.     Diabetic neuropathy: Gabapentin on hold.  COPD on home oxygen    Code status:   Level Of Support  Discussed With: Patient; Next of Kin (If No Surrogate)  Code Status (Patient has no pulse and is not breathing): No CPR (Do Not Attempt to Resuscitate)  Medical Interventions (Patient has pulse or is breathing): Full Support  Release to patient: Routine Release       Nutrition: Diet: Cardiac Diets, Diabetic Diets; Healthy Heart (2-3 Na+); Consistent Carbohydrate; Texture: Regular Texture (IDDSI 7); Fluid Consistency: Thin (IDDSI 0)     DVT prophylaxis:   Mechanical Order History:      Ordered        03/16/23 2324  Place Sequential Compression Device  Once            03/16/23 2324  Maintain Sequential Compression Device  Continuous                    Pharmalogical Order History:      Ordered     Dose Route Frequency Stop    03/18/23 0955  apixaban (ELIQUIS) tablet 5 mg         5 mg PO Every 12 Hours Scheduled --    03/17/23 0934  Enoxaparin Sodium (LOVENOX) syringe 40 mg  Status:  Discontinued         40 mg SC Every 24 Hours 03/18/23 0955    03/16/23 2113  heparin (porcine) 5000 UNIT/ML injection         -- IV Code / Trauma / Sedation Medication 03/16/23 2109 03/16/23 2038  heparin (porcine) 1000 UNIT/ML injection  - ADS Override Pull        Note to Pharmacy: Created by cabinet override    -- -- -- 03/17/23 0838                 Subjective / Review of systems     Review of Systems   Unable to perform ROS: Mental status change      Patient unable to contribute to ROS due to sedation, poor verbalization    Objective / Physical Exam     Vital signs:  Temp: 98.2 °F (36.8 °C)  BP: (!) 85/57  Heart Rate: 109  Resp: 19  SpO2: 91 %  Weight: 58.4 kg (128 lb 12 oz)    Admission Weight: Weight: 59.7 kg (131 lb 9.8 oz)  Current Weight: Weight: 58.4 kg (128 lb 12 oz)    Input/Output in last 24 hours:    Intake/Output Summary (Last 24 hours) at 3/18/2023 1036  Last data filed at 3/18/2023 0400  Gross per 24 hour   Intake 5787.33 ml   Output 445 ml   Net 5342.33 ml      Net IO Since Admission: 5,962.33 mL [03/18/23 1036]      Physical Exam  Constitutional:       General: She is not in acute distress.     Appearance: She is ill-appearing. She is not diaphoretic.      Comments: Sedate   HENT:      Head: Normocephalic and atraumatic.      Right Ear: External ear normal.      Left Ear: External ear normal.      Nose: Nose normal.      Mouth/Throat:      Mouth: Mucous membranes are moist.   Eyes:      General: No scleral icterus.     Conjunctiva/sclera: Conjunctivae normal.      Pupils: Pupils are equal, round, and reactive to light.   Neck:      Comments: No JVD  Trachea midline  Cardiovascular:      Rate and Rhythm: Tachycardia present. Rhythm irregular.      Heart sounds: S1 normal and S2 normal. Murmur heard.      Comments: Sinus tach with frequent PACs  Pulmonary:      Breath sounds: Rhonchi and rales present. No wheezing.      Comments: tachypnea  Abdominal:      General: Bowel sounds are normal. There is no distension.      Palpations: Abdomen is soft.      Tenderness: There is no abdominal tenderness.   Musculoskeletal:      Right lower leg: No edema.      Left lower leg: No edema.   Skin:     General: Skin is warm and dry.      Capillary Refill: Capillary refill takes less than 2 seconds.   Neurological:      Comments: Sedate but awakens easily  Poor verbalization  Intermittently following commands          Radiology and Labs     Results from last 7 days   Lab Units 03/18/23  0418 03/16/23  2337 03/16/23  2315 03/16/23 2207 03/16/23 2108   WBC 10*3/mm3 25.90* 25.80*  --   --  15.70*   HEMOGLOBIN g/dL 11.3* 11.7*  --   --  12.9   HEMOGLOBIN, POC g/dL  --   --  11.5* 13.5  --    HEMATOCRIT % 36.6 36.9  --   --  39.9   HEMATOCRIT POC %  --   --  34* 40  --    PLATELETS 10*3/mm3 181 317  --   --  310      Results from last 7 days   Lab Units 03/16/23  2337 03/16/23 2108   ALK PHOS U/L 71 82   AST (SGOT) U/L 34* 17   ALT (SGPT) U/L 18 10      Results from last 7 days   Lab Units 03/16/23  2108   PROTIME Seconds 9.9   INR  0.96       Results from last 7 days   Lab Units 03/18/23  0418 03/17/23  1105 03/16/23  2337 03/16/23  2108   SODIUM mmol/L 137 135* 134* 133*   POTASSIUM mmol/L 4.8 4.2 4.9 4.5   CHLORIDE mmol/L 102 104 97* 96*   CO2 mmol/L 23.0 17.0* 22.0 25.0   BUN mg/dL 25* 14 12 10   CREATININE mg/dL 0.95 0.78 0.79 0.62   GLUCOSE mg/dL 159* 134* 435* 254*          Current medications     Scheduled Meds:   apixaban, 5 mg, Oral, Q12H  aspirin, 81 mg, Oral, Daily  atorvastatin, 40 mg, Oral, Nightly  furosemide, 40 mg, Intravenous, Q12H  insulin lispro, 2-9 Units, Subcutaneous, 4x Daily AC & at Bedtime  ipratropium-albuterol, 3 mL, Nebulization, 4x Daily - RT  methylPREDNISolone sodium succinate, 40 mg, Intravenous, Q12H  pantoprazole, 40 mg, Intravenous, Q24H  sodium chloride, 10 mL, Intravenous, Q12H  sodium chloride, 10 mL, Intravenous, Q12H        Continuous Infusions:   dexmedetomidine, 0.2-1.5 mcg/kg/hr, Last Rate: 1.5 mcg/kg/hr (03/17/23 1225)  norepinephrine, 0.02-0.3 mcg/kg/min  phenylephrine, 0.5-3 mcg/kg/min, Last Rate: 2.1 mcg/kg/min (03/18/23 0916)          Plan discussed with RN. Reviewed all other data in the last 24 hours, including but not limited to vitals, labs, microbiology, imaging and pertinent notes from other providers.       LORENA Hurley   Critical Care  03/18/23   10:36 EDT     Attending Addendum     Subjective: Seen while on BiPAP.  Able to remove the BiPAP so she can have a conversation.  Still feels short of breath.  Had a lengthy conversation about persistent hypercapnia in spite of being on the BiPAP and the need for intubation.   Gave all options including reintubation versus ongoing BiPAP versus palliative route.  Patient made herself DNR/DNI.  Daughter present at the bedside.  Was to continue with BiPAP for now.  Exam: Alert and awake, on BiPAP, low tidal volumes, tachypneic.  Bibasilar Rales.+ Bilateral pedal edema.    Assessment / Plan:   • Decompensated systolic and diastolic heart failure: Started  on Lasix, switch phenylephrine to norepinephrine.  Monitor input output closely.  • Acute respiratory failure with hypoxia and hypercapnia: Likely from decompensated heart failure.  Chest x-ray with bilateral congestive changes.  Continue diuretics.  Continue with steroids and bronchodilators for now.  DC antibiotics.    I have personally reviewed all labs and other data, reviewed all other pertinent notes, interviewed and examined this patient today. I have also reviewed the note by APRN, discussed the plan and made relevant changes in the note. I have performed the substantive portion of medical decision making.  Spoke with daughter at the bedside.    Dr. Anton Samuels MD MPH  Staff Intensivist  03/18/23   13:45 EDT

## 2023-03-18 NOTE — PLAN OF CARE
Goal Outcome Evaluation:  Pt remains easily agitated but is A/Ox4. Pt often impulsive. Sitter at bedside for safety.   PRN Ativan added by APRN for anxiety.     Edin gtt on - unable to wean overnight.     Minimal UO. No BM.    On 6L NC.     VSS at this time. No needs or distressed voiced by pt at this time.

## 2023-03-19 NOTE — SIGNIFICANT NOTE
Patient family attempting to decide what if they want to go comfort measures. At the current time, patient/family wishes for her to have bipap mask on. Will continue to address decisions with family as they arise.    Electronically signed by LORENA Kerr, 03/18/23, 10:56 PM EDT.

## 2023-03-19 NOTE — CASE MANAGEMENT/SOCIAL WORK
Continued Stay Note  PILLO Garcia     Patient Name: Jamee Malone  MRN: [id number]  Today's Date: 3/19/2023    Admit Date: 3/16/2023    Plan: comfort measures   Discharge Plan     Row Name 03/19/23 1721       Plan    Plan comfort measures    Plan Comments comfort measures started 3/18                Expected Discharge Date and Time     Expected Discharge Date Expected Discharge Time    Mar 24, 2023         Phone communication or documentation only - no physical contact with patient or family.      Julieta Gonzalez RN

## 2023-03-19 NOTE — PLAN OF CARE
Consult was received due to patient being placed on comfort measures.  Discussed with RN.  Hospitalist is available for any questions or needs tonight.  Full consult will be deferred until dayshift as patient is comfort care and has already been seen by a physician.

## 2023-03-19 NOTE — NURSING NOTE
Pt transferred to [facility] on comfort measures.   Daughter, Magda, updated via phone of transfer.     Pt resting comfortably at time of transfer.   Receiving unit aware of pt arrival.

## 2023-03-19 NOTE — SIGNIFICANT NOTE
Family wishes patient to be comfort measures at this time.     Electronically signed by LORENA Kerr, 03/18/23, 11:00 PM EDT.

## 2023-03-19 NOTE — PLAN OF CARE
Goal Outcome Evaluation:      Pt was transferred and head to toe completed around 0145. Pt responsive to pain. No known skin issues. Pt comfort care.

## 2023-03-19 NOTE — PLAN OF CARE
Goal Outcome Evaluation:         Declining  Pt appears calm, no anxiety or pain noted and has been resting comfortably. Pt turned per protocol. Family visited earlier and requested that when pt passes that they would like to come see her and for her body to be sent to [facility]  Home in [place], [place]. Will continue to monitor and document as needed.

## 2023-03-19 NOTE — NURSING NOTE
Pt on AirVo and very agitated. Attempted to reorient. Switched to BiPAP.   HR also in 150s-160s. APRN at bedside.   Amio gtt started. HR returned to normal after completion of bolus.     Approx 2115 pt displayed significant decreased LOC. Pt minimally responsive to noxious stimuli and will not follow commands. APRN at bedside.      Spoke with daughter, Magda, over the phone regarding next steps. Acknowledged that pt decided against reintubation. Advised  daughter that pt's oxygen saturations were currently stable, although we are unable to tell if they will remain stable given her mentation.     Pt now on BiPAP at 100% and minimally responsive.

## 2023-03-19 NOTE — PROGRESS NOTES
[facility] Medicine Services Daily Progress Note    Patient Name: Jamee Malone  : 1945  MRN: [id number]  Primary Care Physician:  Edie Donahue NP  Date of admission: 3/16/2023      Subjective      Chief Complaint: STEMI      Patient comfortable, minimally arousable.    ROS negative except as mentioned above, patient comfort measures      Objective      Vitals:   Temp:  [96.7 °F (35.9 °C)-98 °F (36.7 °C)] 96.9 °F (36.1 °C)  Heart Rate:  [] 95  Resp:  [19-29] 19  BP: ()/() 113/59  Flow (L/min):  [3-50] 3    Physical exam:  Patient apparently comfortable, sleeping not awoken secondary to patient being on comfort measures.  Rest of physical exam deferred       Result Review    Result Review:  I have personally reviewed the results from the time of this admission to 3/19/2023 11:42 EDT and agree with these findings:  [x]  Laboratory  []  Microbiology  []  Radiology  [x]  EKG/Telemetry   [x]  Cardiology/Vascular   []  Pathology  []  Old records  []  Other:  Most notable findings include:        Assessment & Plan      Brief Patient Summary:  Jamee Malone is a 78 y.o. female who                 Active Hospital Problems:  Active Hospital Problems    Diagnosis    • **ST elevation myocardial infarction involving left anterior descending (LAD) coronary artery (HCC)    • Moderate malnutrition (HCC)      Plan:     Cardiogenic shock  STEMI  Palliative care  Comfort measures  -Patient initially admitted to ICU, underwent LHC with 100% occlusion in RCA.  Condition complicated by cardiogenic shock patient was on 2 vasopressors Levophed and phenylephrine.  GOC discussion with family electing for comfort measures.  -As needed glycopyrrolate  -As needed Ativan  -As needed Dilaudid    DVT prophylaxis:  Mechanical DVT prophylaxis orders are present.    CODE STATUS:    Level Of Support Discussed With: Patient; Next of Kin (If No Surrogate)  Code Status (Patient has no pulse and is not  breathing): No CPR (Do Not Attempt to Resuscitate)  Medical Interventions (Patient has pulse or is breathing): Comfort Measures  Release to patient: Routine Release      Disposition:  I expect patient to be discharged 1 to 2 days.      Electronically signed by Cm Chu MD, 03/19/23, 11:42 EDT.  [facility] Hospitalist Team

## 2023-03-20 NOTE — CASE MANAGEMENT/SOCIAL WORK
Case Management/Social Work    Patient Name:  Jamee Malone  YOB: 1945  MRN: [id number]  Admit Date:  3/16/2023    Call received from Charge RN about patient's belongings with security and release to [facility]. Family has elected to use [facility]  [place], [place], [place]. In addition, belongings can be released to [facility] known NOK, daughter-Magda Brito. Security contacted and updated on information, encouraged to request photo ID for confirmation.     Electronically signed by:  MARIBELL Metzger  23 10:21 EDT

## 2023-03-21 NOTE — DISCHARGE SUMMARY
[facility] Medicine Services  DISCHARGE SUMMARY    Patient Name: Jamee Malone  : 1945  MRN: [id number]    Date of Admission: 3/16/2023  Discharge Diagnosis: STEMI/cardiogenic shock  Date of Discharge: 3/20/2023  Primary Care Physician: Edie Donahue NP      Presenting Problem:   COPD exacerbation ([facility]) [J44.1]  Atrial fibrillation with RVR ([facility]) [I48.91]  ST elevation myocardial infarction involving left anterior descending (LAD) coronary artery ([facility]) [I21.02]    Active and Resolved Hospital Problems:  Active Hospital Problems    Diagnosis POA   • **ST elevation myocardial infarction involving left anterior descending (LAD) coronary artery ([facility]) [I21.02] Yes   • Moderate malnutrition ([facility]) [E44.0] Yes      Resolved Hospital Problems   No resolved problems to display.         Hospital Course     Hospital Course:  Jamee Malone is a 78 y.o. female        Cardiogenic shock  STEMI  Palliative care  Comfort measures  -Patient initially admitted to ICU, underwent LHC with 100% occlusion in RCA.  Condition complicated by cardiogenic shock patient was on 2 vasopressors Levophed and phenylephrine.  GOC discussion with family electing for comfort measures.  -Patient  on 3/20    DISCHARGE Follow Up Recommendations for labs and diagnostics:   N/A  Reasons For Change In Medications and Indications for New Medications:      Day of Discharge     Vital Signs:       Physical Exam:  Physical Exam   Unresponsive to verbal and tactile stimuli  Absent heart and lung sounds on chest auscultation  Absent brainstem reflexes    Pertinent  and/or Most Recent Results     LAB RESULTS:      Lab 23  0418 23  2337 23  2315 23  2207 23  2108   WBC 25.90* 25.80*  --   --  15.70*   HEMOGLOBIN 11.3* 11.7*  --   --  12.9   HEMOGLOBIN, POC  --   --  11.5* 13.5  --    HEMATOCRIT 36.6 36.9  --   --  39.9   HEMATOCRIT POC  --   --  34* 40  --    PLATELETS 181 317  --   --  310    NEUTROS ABS 23.60* 21.93*  --   --  8.90*   LYMPHS ABS 1.10  --   --   --  5.40*   MONOS ABS 1.10*  --   --   --  1.10*   EOS ABS 0.00 0.77*  --   --  0.30   MCV 86.9 85.5  --   --  85.3   PROCALCITONIN  --  0.06  --   --   --    LACTATE  --  3.7* 3.2* 3.4*  --    PROTIME  --   --   --   --  9.9         Lab 03/18/23  0418 03/17/23  1105 03/16/23 2337 03/16/23 2108   SODIUM 137 135* 134* 133*   POTASSIUM 4.8 4.2 4.9 4.5   CHLORIDE 102 104 97* 96*   CO2 23.0 17.0* 22.0 25.0   ANION GAP 12.0 14.0 15.0 12.0   BUN 25* 14 12 10   CREATININE 0.95 0.78 0.79 0.62   EGFR 60.7 76.9 75.7 90.2   GLUCOSE 159* 134* 435* 254*   CALCIUM 8.2* 7.7* 7.5* 8.9   IONIZED CALCIUM  --   --  1.05*  --    MAGNESIUM 2.0  --  1.9 2.0   PHOSPHORUS 4.6*  --  8.0*  --    HEMOGLOBIN A1C  --   --  5.70*  --    TSH  --   --  2.790  --          Lab 03/18/23  0418 03/16/23 2337 03/16/23 2108   TOTAL PROTEIN  --  5.1* 6.6   ALBUMIN 3.6 3.0* 3.9   GLOBULIN  --  2.1 2.7   ALT (SGPT)  --  18 10   AST (SGOT)  --  34* 17   BILIRUBIN  --  0.2 0.2   ALK PHOS  --  71 82         Lab 03/16/23 2337 03/16/23 2108   HSTROP T 632* 38*   PROTIME  --  9.9   INR  --  0.96         Lab 03/16/23 2337   CHOLESTEROL 193   LDL CHOL 100   HDL CHOL 78*   TRIGLYCERIDES 83             Lab 03/18/23  1658 03/18/23  1007 03/18/23  0845 03/17/23  1452 03/17/23  0503   PH, ARTERIAL  --   --  7.208* 7.365 7.315*   PCO2, ARTERIAL  --   --  59.7* 38.9 33.6*   PO2 ART  --   --  94.2 113.1* 141.0*   O2 SATURATION ART  --   --  95.1 98.3* 99.0*   FIO2 46 44 44 40 40   HCO3 ART  --   --  23.8 22.2 17.1*   BASE EXCESS ART  --   --  -4.8* -2.9* -8.1*     Brief Urine Lab Results  (Last result in the past 365 days)      Color   Clarity   Blood   Leuk Est   Nitrite   Protein   CREAT   Urine HCG        03/17/23 0549 Yellow   Cloudy   Moderate (2+)   Negative   Negative   100 mg/dL (2+)               Microbiology Results (last 10 days)     Procedure Component Value - Date/Time    S. Pneumo  Ag Urine or CSF - Urine, Urine, Clean Catch [[id number]]  (Normal) Collected: 03/18/23 1409    Lab Status: Final result Specimen: Urine, Clean Catch Updated: 03/18/23 1438     Strep Pneumo Ag Negative    Legionella Antigen, Urine - Urine, Urine, Clean Catch [[id number]]  (Normal) Collected: 03/18/23 1409    Lab Status: Final result Specimen: Urine, Clean Catch Updated: 03/18/23 1438     LEGIONELLA ANTIGEN, URINE Negative    Respiratory Panel PCR w/COVID-19(SARS-CoV-2) RAJI/TIMMY/DELTA/PAD/COR/MAD/LEONOR In-House, NP Swab in UTM/VTM, 3-4 HR TAT - Swab, Nasopharynx [[id number]]  (Normal) Collected: 03/17/23 0817    Lab Status: Final result Specimen: Swab from Nasopharynx Updated: 03/17/23 0933     ADENOVIRUS, PCR Not Detected     Coronavirus 229E Not Detected     Coronavirus HKU1 Not Detected     Coronavirus NL63 Not Detected     Coronavirus OC43 Not Detected     COVID19 Not Detected     Human Metapneumovirus Not Detected     Human Rhinovirus/Enterovirus Not Detected     Influenza A PCR Not Detected     Influenza B PCR Not Detected     Parainfluenza Virus 1 Not Detected     Parainfluenza Virus 2 Not Detected     Parainfluenza Virus 3 Not Detected     Parainfluenza Virus 4 Not Detected     RSV, PCR Not Detected     Bordetella pertussis pcr Not Detected     Bordetella parapertussis PCR Not Detected     Chlamydophila pneumoniae PCR Not Detected     Mycoplasma pneumo by PCR Not Detected    Narrative:      In the setting of a positive respiratory panel with a viral infection PLUS a negative procalcitonin without other underlying concern for bacterial infection, consider observing off antibiotics or discontinuation of antibiotics and continue supportive care. If the respiratory panel is positive for atypical bacterial infection (Bordetella pertussis, Chlamydophila pneumoniae, or Mycoplasma pneumoniae), consider antibiotic de-escalation to target atypical bacterial infection.    Blood Culture - Blood, Arm, Left [[id number]]  (Normal)  Collected: 03/17/23 0547    Lab Status: Preliminary result Specimen: Blood from Arm, Left Updated: 03/21/23 0600     Blood Culture No growth at 4 days    Blood Culture - Blood, Arm, Right [[id number]]  (Normal) Collected: 03/17/23 0547    Lab Status: Preliminary result Specimen: Blood from Arm, Right Updated: 03/21/23 0600     Blood Culture No growth at 4 days    Respiratory Culture - Sputum, ET Suction [[id number]]  (Abnormal) Collected: 03/17/23 0521    Lab Status: Final result Specimen: Sputum from ET Suction Updated: 03/20/23 1056     Respiratory Culture Heavy growth (4+) Haemophilus influenzae     Comment: This isolate is beta-lactamase NEGATIVE and are routinely susceptible to ampicillin and other beta-lactams.           Moderate growth (3+) Normal Respiratory Marilyn     Gram Stain Rare (1+) Epithelial cells per low power field      Many (4+) WBCs per low power field      Moderate (3+) Gram positive cocci in pairs and chains      Moderate (3+) Gram negative bacilli    Narrative:              MRSA Screen, PCR (Inpatient) - Swab, Nares [[id number]]  (Normal) Collected: 03/17/23 0329    Lab Status: Final result Specimen: Swab from Nares Updated: 03/17/23 0549     MRSA PCR No MRSA Detected    Narrative:      The negative predictive value of this diagnostic test is high and should only be used to consider de-escalating anti-MRSA therapy. A positive result may indicate colonization with MRSA and must be correlated clinically.          XR Chest 1 View    Result Date: 3/18/2023  Impression: Impression: 1. Interval extubation. 2. Left basilar/lingular airspace disease may relate to atelectasis or pneumonia. Electronically Signed: Edvin Corbett  3/18/2023 10:24 AM EDT  Workstation ID: [id number]    XR Chest 1 View    Result Date: 3/17/2023  Impression: Impression: 1. Pulmonary hyperinflation suggesting COPD 2. Stable mild interstitial prominence with possible small pleural effusion suggesting mild edema Electronically Signed:  Galen Day  3/17/2023 8:02 AM EDT  Workstation ID: [id number]    XR Chest 1 View    Result Date: 3/16/2023  Impression: Impression: Interstitial disease in the lungs but could reflect chronic disease or mild interstitial edema. Electronically Signed: Aldo Daniel  3/16/2023 9:23 PM EDT  Workstation ID: [id number]              Results for orders placed during the hospital encounter of 03/16/23    Adult Transthoracic Echo Complete W/ Cont if Necessary Per Protocol    Interpretation Summary  •  Left ventricular systolic function is severely decreased. Left ventricular ejection fraction appears to be less than 20%.  •  Left ventricular wall thickness is consistent with mild concentric hypertrophy.  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  •  Left ventricular diastolic dysfunction is noted.  •  Moderately reduced right ventricular systolic function noted.  •  The right ventricular cavity is mild to moderately dilated.  •  Moderate to severe tricuspid valve regurgitation is present.  •  Estimated right ventricular systolic pressure from tricuspid regurgitation is mildly elevated (35-45 mmHg).  •  Moderate pulmonary hypertension is present.  •  Mild dilation of the proximal aorta is present.      Labs Pending at Discharge:  Pending Labs     Order Current Status    Blood Culture - Blood, Arm, Left Preliminary result    Blood Culture - Blood, Arm, Right Preliminary result          Procedures Performed  Procedure(s):  Left Heart Cath         Consults:   Consults     No orders found from 2/15/2023 to 3/17/2023.            Discharge Details        Discharge Medications      ASK your doctor about these medications      Instructions Start Date   gabapentin 300 MG capsule  Commonly known as: NEURONTIN   300 mg, Oral, 3 Times Daily      hydroCHLOROthiazide 25 MG tablet  Commonly known as: HYDRODIURIL   25 mg, Oral, Daily      metFORMIN 1000 MG tablet  Commonly known as: GLUCOPHAGE   1,000 mg,  Oral, 2 Times Daily With Meals      potassium chloride 10 MEQ CR capsule  Commonly known as: MICRO-K   10 mEq, Oral, Daily             No Known Allergies      Discharge Disposition:       Diet:  Hospital:No active diet order        Discharge Activity:         CODE STATUS:  Code Status and Medical Interventions:   Ordered at: 23 [id number]     Level Of Support Discussed With:    Patient    Next of Kin (If No Surrogate)     Code Status (Patient has no pulse and is not breathing):    No CPR (Do Not Attempt to Resuscitate)     Medical Interventions (Patient has pulse or is breathing):    Comfort Measures     Release to patient:    Routine Release         No future appointments.        Time spent on Discharge including face to face service:  30 minutes        Signature: Electronically signed by Cm Chu MD, 23, 3:40 PM EDT.

## 2023-03-21 NOTE — PROGRESS NOTES
"Enter Query Response Below      Query Response: Takotsubo's cardiomyopathy    Electronically signed by Jazmín Vega MD, 23, 5:28 PM EDT.              If applicable, please update the problem list.   Patient: Jamee Malone        : 1945  Account: [id number]           Admit Date: 3/16/2023        How to Respond to this query:       a. Click New Note     b. Answer query within the yellow box.                c. Update the Problem List, if applicable.      If you have any questions about this query contact me at: [phone]    :    78-year-old patient admitted 3/16/2023 with STEMI involving LAD presents with shortness of breath and lateral ST elevations on EKG, per ED Provider Notes. Cardiology PN 3/18 includes \"Stress cardiomyopathy\" and states \"EKG performed in the Cath Lab demonstrates complete resolution of the ST segment elevation.  It certainly possible that there was a thrombus in one of the vessels may be the mid LAD that resolved\".  Patient had HS Troponin T elevation from 38 on arrival to 632 with Troponin Delta of 594.   Cardiac cath findings include 100% RCA occlusion with left to right collaterals and 40% occlusion to LAD.   Echocardiogram 3/16 findings include \"The findings are consistent with stress-induced (Takotsubo) cardiomyopathy\".   Treatment has included emergent heart cath, Levophed drip, Heparin IV, Tridil, Edin-Synephrine drip, Vasopressin IV, mechanical ventilation, Palliative Care Consult, and Comfort Measures.  After study, can the condition being treated be clarified as:    STEMI likely due to LAD thrombus  Takotsubo's cardiomyopathy  Both, STEMI andTakotsubo's cardiomyopathy  Other, please specify: _____________  Unable to clinically determine       By submitting this query, we are merely seeking further clarification of documentation to accurately reflect all conditions that you are monitoring, evaluating, treating or that extend the hospitalization or " utilize additional resources of care. Please utilize your independent clinical judgment when addressing the question(s) above.     This query and your response, once completed, will be entered into the legal medical record.    Sincerely,  Julissa Flores RN, BSN, CCDS  Clinical Documentation Integrity Program   [email or web address]

## 2023-03-22 LAB
BACTERIA SPEC AEROBE CULT: NORMAL
BACTERIA SPEC AEROBE CULT: NORMAL

## (undated) DEVICE — PK TRY HEART CATH 50

## (undated) DEVICE — PINNACLE INTRODUCER SHEATH: Brand: PINNACLE

## (undated) DEVICE — CATH DIAG IMPULSE FL4 6F 100CM

## (undated) DEVICE — TBG NAMIC PRESS MONTR A/ F/M 12IN

## (undated) DEVICE — MEDICINE CUP, GRADUATED, STER: Brand: MEDLINE

## (undated) DEVICE — CONTRST ISOVUE300 61PCT 50ML

## (undated) DEVICE — GW WHOLEY HITORQ MOD/J .035 145CM

## (undated) DEVICE — ST ACC MICROPUNCTURE STFF/CANN PLAT/TP 4F 21G 40CM

## (undated) DEVICE — STPCK 3WY HP ROT

## (undated) DEVICE — BOWL PLSTC MD 16OZ BLU STRL

## (undated) DEVICE — CATH DIAG IMPULSE PIG .056 6F 110CM

## (undated) DEVICE — SUT SILK 2/0 FS BLK 18IN 685G

## (undated) DEVICE — SYR LL TP 10ML STRL

## (undated) DEVICE — CATH DIAG IMPULSE FR4 6F 100CM

## (undated) DEVICE — ELECTRD DEFIB M/FUNC PROPADZ RADIOL 2PK